# Patient Record
Sex: MALE | Race: WHITE | Employment: UNEMPLOYED | URBAN - METROPOLITAN AREA
[De-identification: names, ages, dates, MRNs, and addresses within clinical notes are randomized per-mention and may not be internally consistent; named-entity substitution may affect disease eponyms.]

---

## 2022-01-01 ENCOUNTER — APPOINTMENT (OUTPATIENT)
Dept: ULTRASOUND IMAGING | Age: 57
DRG: 823 | End: 2022-01-01
Attending: STUDENT IN AN ORGANIZED HEALTH CARE EDUCATION/TRAINING PROGRAM
Payer: MEDICARE

## 2022-01-01 ENCOUNTER — APPOINTMENT (OUTPATIENT)
Dept: GENERAL RADIOLOGY | Age: 57
DRG: 823 | End: 2022-01-01
Attending: STUDENT IN AN ORGANIZED HEALTH CARE EDUCATION/TRAINING PROGRAM
Payer: MEDICARE

## 2022-01-01 ENCOUNTER — APPOINTMENT (OUTPATIENT)
Dept: CT IMAGING | Age: 57
DRG: 823 | End: 2022-01-01
Attending: STUDENT IN AN ORGANIZED HEALTH CARE EDUCATION/TRAINING PROGRAM
Payer: MEDICARE

## 2022-01-01 ENCOUNTER — APPOINTMENT (OUTPATIENT)
Dept: VASCULAR LAB | Age: 57
DRG: 823 | End: 2022-01-01
Attending: STUDENT IN AN ORGANIZED HEALTH CARE EDUCATION/TRAINING PROGRAM
Payer: MEDICARE

## 2022-01-01 ENCOUNTER — HOSPITAL ENCOUNTER (INPATIENT)
Age: 57
LOS: 2 days | DRG: 823 | End: 2022-01-06
Attending: STUDENT IN AN ORGANIZED HEALTH CARE EDUCATION/TRAINING PROGRAM | Admitting: STUDENT IN AN ORGANIZED HEALTH CARE EDUCATION/TRAINING PROGRAM
Payer: MEDICARE

## 2022-01-01 VITALS
TEMPERATURE: 99 F | WEIGHT: 203.04 LBS | HEIGHT: 71 IN | BODY MASS INDEX: 28.43 KG/M2 | DIASTOLIC BLOOD PRESSURE: 28 MMHG | OXYGEN SATURATION: 61 % | HEART RATE: 77 BPM | SYSTOLIC BLOOD PRESSURE: 43 MMHG

## 2022-01-01 DIAGNOSIS — D61.818 PANCYTOPENIA (HCC): Primary | ICD-10-CM

## 2022-01-01 LAB
ABO/RH: NORMAL
ALBUMIN SERPL-MCNC: 2.2 G/DL (ref 3.1–4.9)
ALBUMIN SERPL-MCNC: 2.6 G/DL (ref 3.4–5)
ALBUMIN SERPL-MCNC: 2.6 G/DL (ref 3.4–5)
ALBUMIN SERPL-MCNC: 2.7 G/DL (ref 3.4–5)
ALBUMIN SERPL-MCNC: 2.9 G/DL (ref 3.4–5)
ALBUMIN SERPL-MCNC: 2.9 G/DL (ref 3.4–5)
ALP BLD-CCNC: 116 U/L (ref 40–129)
ALP BLD-CCNC: 118 U/L (ref 40–129)
ALP BLD-CCNC: 146 U/L (ref 40–129)
ALPHA-1-GLOBULIN: 0.5 G/DL (ref 0.2–0.4)
ALPHA-2-GLOBULIN: 0.9 G/DL (ref 0.4–1.1)
ALT SERPL-CCNC: 18 U/L (ref 10–40)
ALT SERPL-CCNC: 21 U/L (ref 10–40)
ALT SERPL-CCNC: 22 U/L (ref 10–40)
AMMONIA: 109 UMOL/L (ref 16–60)
AMMONIA: 48 UMOL/L (ref 16–60)
AMMONIA: 77 UMOL/L (ref 16–60)
ANION GAP SERPL CALCULATED.3IONS-SCNC: 10 MMOL/L (ref 3–16)
ANION GAP SERPL CALCULATED.3IONS-SCNC: 11 MMOL/L (ref 3–16)
ANION GAP SERPL CALCULATED.3IONS-SCNC: 12 MMOL/L (ref 3–16)
ANION GAP SERPL CALCULATED.3IONS-SCNC: 15 MMOL/L (ref 3–16)
ANION GAP SERPL CALCULATED.3IONS-SCNC: 15 MMOL/L (ref 3–16)
ANISOCYTOSIS: ABNORMAL
ANISOCYTOSIS: ABNORMAL
ANTIBODY SCREEN: NORMAL
APPEARANCE CSF: CLEAR
APTT: 31.3 SEC (ref 26.2–38.6)
APTT: 32.6 SEC (ref 26.2–38.6)
AST SERPL-CCNC: 26 U/L (ref 15–37)
AST SERPL-CCNC: 29 U/L (ref 15–37)
AST SERPL-CCNC: 35 U/L (ref 15–37)
BACTERIA: ABNORMAL /HPF
BANDED NEUTROPHILS RELATIVE PERCENT: 3 % (ref 0–7)
BANDED NEUTROPHILS RELATIVE PERCENT: 9 % (ref 0–7)
BASE EXCESS ARTERIAL: -2 (ref -3–3)
BASOPHILS ABSOLUTE: 0 K/UL (ref 0–0.2)
BASOPHILS ABSOLUTE: 0 K/UL (ref 0–0.2)
BASOPHILS RELATIVE PERCENT: 0 %
BASOPHILS RELATIVE PERCENT: 0 %
BETA GLOBULIN: 0.8 G/DL (ref 0.9–1.6)
BILIRUB SERPL-MCNC: 2.4 MG/DL (ref 0–1)
BILIRUB SERPL-MCNC: 2.7 MG/DL (ref 0–1)
BILIRUB SERPL-MCNC: 3.1 MG/DL (ref 0–1)
BILIRUBIN DIRECT: 1.9 MG/DL (ref 0–0.3)
BILIRUBIN DIRECT: 2.1 MG/DL (ref 0–0.3)
BILIRUBIN DIRECT: 2.5 MG/DL (ref 0–0.3)
BILIRUBIN URINE: ABNORMAL
BILIRUBIN, INDIRECT: 0.5 MG/DL (ref 0–1)
BILIRUBIN, INDIRECT: 0.6 MG/DL (ref 0–1)
BILIRUBIN, INDIRECT: 0.6 MG/DL (ref 0–1)
BLOOD BANK DISPENSE STATUS: NORMAL
BLOOD BANK PRODUCT CODE: NORMAL
BLOOD SMEAR REVIEW: NORMAL
BLOOD, URINE: ABNORMAL
BPU ID: NORMAL
BUN BLDV-MCNC: 65 MG/DL (ref 7–20)
BUN BLDV-MCNC: 70 MG/DL (ref 7–20)
BUN BLDV-MCNC: 71 MG/DL (ref 7–20)
BUN BLDV-MCNC: 72 MG/DL (ref 7–20)
BUN BLDV-MCNC: 72 MG/DL (ref 7–20)
BUN BLDV-MCNC: 74 MG/DL (ref 7–20)
BUN BLDV-MCNC: 78 MG/DL (ref 7–20)
C-REACTIVE PROTEIN: 545.3 MG/L (ref 0–5.1)
CALCIUM IONIZED: 1.32 MMOL/L (ref 1.12–1.32)
CALCIUM IONIZED: 1.39 MMOL/L (ref 1.12–1.32)
CALCIUM SERPL-MCNC: 8.1 MG/DL (ref 8.3–10.6)
CALCIUM SERPL-MCNC: 8.4 MG/DL (ref 8.3–10.6)
CALCIUM SERPL-MCNC: 8.6 MG/DL (ref 8.3–10.6)
CALCIUM SERPL-MCNC: 9.1 MG/DL (ref 8.3–10.6)
CALCIUM SERPL-MCNC: 9.2 MG/DL (ref 8.3–10.6)
CALCIUM SERPL-MCNC: 9.3 MG/DL (ref 8.3–10.6)
CALCIUM SERPL-MCNC: 9.8 MG/DL (ref 8.3–10.6)
CHLORIDE BLD-SCNC: 109 MMOL/L (ref 99–110)
CHLORIDE BLD-SCNC: 112 MMOL/L (ref 99–110)
CHLORIDE BLD-SCNC: 114 MMOL/L (ref 99–110)
CHLORIDE BLD-SCNC: 114 MMOL/L (ref 99–110)
CHLORIDE BLD-SCNC: 116 MMOL/L (ref 99–110)
CHLORIDE BLD-SCNC: 117 MMOL/L (ref 99–110)
CHLORIDE BLD-SCNC: 118 MMOL/L (ref 99–110)
CHLORIDE URINE RANDOM: <20 MMOL/L
CLARITY: CLEAR
CLOT EVALUATION CSF: ABNORMAL
CO2: 22 MMOL/L (ref 21–32)
CO2: 23 MMOL/L (ref 21–32)
CO2: 23 MMOL/L (ref 21–32)
CO2: 24 MMOL/L (ref 21–32)
CO2: 25 MMOL/L (ref 21–32)
COLOR CSF: ABNORMAL
COLOR: YELLOW
CORTISOL TOTAL: 34.8 UG/DL
CREAT SERPL-MCNC: 2.4 MG/DL (ref 0.9–1.3)
CREAT SERPL-MCNC: 2.6 MG/DL (ref 0.9–1.3)
CREAT SERPL-MCNC: 2.6 MG/DL (ref 0.9–1.3)
CREAT SERPL-MCNC: 2.8 MG/DL (ref 0.9–1.3)
CREAT SERPL-MCNC: 2.8 MG/DL (ref 0.9–1.3)
CREAT SERPL-MCNC: 2.9 MG/DL (ref 0.9–1.3)
CREAT SERPL-MCNC: 3.3 MG/DL (ref 0.9–1.3)
CREATININE URINE: 97.9 MG/DL (ref 39–259)
D DIMER: 485 NG/ML DDU (ref 0–229)
DESCRIPTION BLOOD BANK: NORMAL
DOHLE BODIES: PRESENT
EKG ATRIAL RATE: 107 BPM
EKG DIAGNOSIS: NORMAL
EKG P AXIS: 36 DEGREES
EKG P-R INTERVAL: 136 MS
EKG Q-T INTERVAL: 316 MS
EKG QRS DURATION: 102 MS
EKG QTC CALCULATION (BAZETT): 421 MS
EKG R AXIS: 18 DEGREES
EKG T AXIS: 82 DEGREES
EKG VENTRICULAR RATE: 107 BPM
EOSINOPHILS ABSOLUTE: 0 K/UL (ref 0–0.6)
EOSINOPHILS ABSOLUTE: 0 K/UL (ref 0–0.6)
EOSINOPHILS RELATIVE PERCENT: 0 %
EOSINOPHILS RELATIVE PERCENT: 3 %
FERRITIN: ABNORMAL NG/ML (ref 30–400)
FIBRINOGEN: 766 MG/DL (ref 200–397)
GAMMA GLOBULIN: 0.4 G/DL (ref 0.6–1.8)
GFR AFRICAN AMERICAN: 24
GFR AFRICAN AMERICAN: 27
GFR AFRICAN AMERICAN: 28
GFR AFRICAN AMERICAN: 28
GFR AFRICAN AMERICAN: 31
GFR AFRICAN AMERICAN: 31
GFR AFRICAN AMERICAN: 34
GFR NON-AFRICAN AMERICAN: 19
GFR NON-AFRICAN AMERICAN: 23
GFR NON-AFRICAN AMERICAN: 26
GFR NON-AFRICAN AMERICAN: 26
GFR NON-AFRICAN AMERICAN: 28
GLUCOSE BLD-MCNC: 117 MG/DL (ref 70–99)
GLUCOSE BLD-MCNC: 121 MG/DL (ref 70–99)
GLUCOSE BLD-MCNC: 131 MG/DL (ref 70–99)
GLUCOSE BLD-MCNC: 134 MG/DL (ref 70–99)
GLUCOSE BLD-MCNC: 135 MG/DL (ref 70–99)
GLUCOSE BLD-MCNC: 154 MG/DL (ref 70–99)
GLUCOSE BLD-MCNC: 157 MG/DL (ref 70–99)
GLUCOSE BLD-MCNC: 220 MG/DL (ref 70–99)
GLUCOSE BLD-MCNC: 379 MG/DL (ref 70–99)
GLUCOSE URINE: NEGATIVE MG/DL
GLUCOSE, CSF: 82 MG/DL (ref 40–80)
HAPTOGLOBIN: 264 MG/DL (ref 30–200)
HCO3 ARTERIAL: 23.3 MMOL/L (ref 21–29)
HCT VFR BLD CALC: 17.5 % (ref 40.5–52.5)
HCT VFR BLD CALC: 19.4 % (ref 40.5–52.5)
HCT VFR BLD CALC: 21.6 % (ref 40.5–52.5)
HCT VFR BLD CALC: 25.3 % (ref 40.5–52.5)
HEMOGLOBIN: 5.9 G/DL (ref 13.5–17.5)
HEMOGLOBIN: 6.5 G/DL (ref 13.5–17.5)
HEMOGLOBIN: 7.2 G/DL (ref 13.5–17.5)
HEMOGLOBIN: 8.4 G/DL (ref 13.5–17.5)
HYPOCHROMIA: ABNORMAL
IMMATURE RETIC FRACT: 0.32 (ref 0.21–0.37)
INR BLD: 1.66 (ref 0.88–1.12)
INR BLD: 1.8 (ref 0.88–1.12)
INR BLD: 1.95 (ref 0.88–1.12)
KAPPA, FREE LIGHT CHAINS, SERUM: 27.95 MG/L (ref 3.3–19.4)
KAPPA/LAMBDA RATIO: 1.37 (ref 0.26–1.65)
KAPPA/LAMBDA TEST COMMENT: ABNORMAL
KETONES, URINE: NEGATIVE MG/DL
L. PNEUMOPHILA SEROGP 1 UR AG: NORMAL
LACTATE DEHYDROGENASE: 1314 U/L (ref 100–190)
LACTATE DEHYDROGENASE: 935 U/L (ref 100–190)
LACTATE: 1.07 MMOL/L (ref 0.4–2)
LACTATE: 1.98 MMOL/L (ref 0.4–2)
LACTATE: 2.19 MMOL/L (ref 0.4–2)
LACTATE: 2.21 MMOL/L (ref 0.4–2)
LACTATE: 2.79 MMOL/L (ref 0.4–2)
LACTATE: 3.12 MMOL/L (ref 0.4–2)
LACTIC ACID: 2.2 MMOL/L (ref 0.4–2)
LACTIC ACID: 2.4 MMOL/L (ref 0.4–2)
LACTIC ACID: 2.5 MMOL/L (ref 0.4–2)
LACTIC ACID: 3.1 MMOL/L (ref 0.4–2)
LAMBDA, FREE LIGHT CHAINS, SERUM: 20.35 MG/L (ref 5.71–26.3)
LEUKOCYTE ESTERASE, URINE: ABNORMAL
LV EF: 58 %
LVEF MODALITY: NORMAL
LYMPHOCYTES ABSOLUTE: 0.1 K/UL (ref 1–5.1)
LYMPHOCYTES ABSOLUTE: 0.1 K/UL (ref 1–5.1)
LYMPHOCYTES RELATIVE PERCENT: 14 %
LYMPHOCYTES RELATIVE PERCENT: 23 %
MAGNESIUM: 2.8 MG/DL (ref 1.8–2.4)
MCH RBC QN AUTO: 26.2 PG (ref 26–34)
MCH RBC QN AUTO: 26.3 PG (ref 26–34)
MCH RBC QN AUTO: 26.8 PG (ref 26–34)
MCH RBC QN AUTO: 28.3 PG (ref 26–34)
MCHC RBC AUTO-ENTMCNC: 33.3 G/DL (ref 31–36)
MCHC RBC AUTO-ENTMCNC: 33.3 G/DL (ref 31–36)
MCHC RBC AUTO-ENTMCNC: 33.5 G/DL (ref 31–36)
MCHC RBC AUTO-ENTMCNC: 33.5 G/DL (ref 31–36)
MCV RBC AUTO: 78.3 FL (ref 80–100)
MCV RBC AUTO: 78.5 FL (ref 80–100)
MCV RBC AUTO: 80.6 FL (ref 80–100)
MCV RBC AUTO: 85 FL (ref 80–100)
MENINGITIS ENCEPHALITIS PANEL: NORMAL
METAMYELOCYTES RELATIVE PERCENT: 1 %
MICROCYTES: ABNORMAL
MICROSCOPIC EXAMINATION: YES
MONOCYTES ABSOLUTE: 0 K/UL (ref 0–1.3)
MONOCYTES ABSOLUTE: 0 K/UL (ref 0–1.3)
MONOCYTES RELATIVE PERCENT: 4 %
MONOCYTES RELATIVE PERCENT: 7 %
MRSA SCREEN RT-PCR: ABNORMAL
NEUTROPHILS ABSOLUTE: 0.4 K/UL (ref 1.7–7.7)
NEUTROPHILS ABSOLUTE: 0.5 K/UL (ref 1.7–7.7)
NEUTROPHILS RELATIVE PERCENT: 67 %
NEUTROPHILS RELATIVE PERCENT: 69 %
NITRITE, URINE: NEGATIVE
NO DIFFERENTIAL CSF: ABNORMAL
NUCLEATED RED BLOOD CELLS: 1 /100 WBC
O2 SAT, ARTERIAL: 87 % (ref 93–100)
ORGANISM: ABNORMAL
PARATHYROID HORMONE INTACT: 9.4 PG/ML (ref 14–72)
PCO2 ARTERIAL: 39.3 MM HG (ref 35–45)
PCO2, VEN: >250 MM HG (ref 40–50)
PDW BLD-RTO: 18.1 % (ref 12.4–15.4)
PDW BLD-RTO: 18.1 % (ref 12.4–15.4)
PDW BLD-RTO: 18.3 % (ref 12.4–15.4)
PDW BLD-RTO: 18.4 % (ref 12.4–15.4)
PERFORMED ON: ABNORMAL
PERFORMED ON: NORMAL
PH ARTERIAL: 7.38 (ref 7.35–7.45)
PH UA: 6 (ref 5–8)
PH VENOUS: 6.61 (ref 7.35–7.45)
PHOSPHORUS: 10.8 MG/DL (ref 2.5–4.9)
PHOSPHORUS: 5.1 MG/DL (ref 2.5–4.9)
PHOSPHORUS: 5.2 MG/DL (ref 2.5–4.9)
PHOSPHORUS: 5.4 MG/DL (ref 2.5–4.9)
PHOSPHORUS: 5.7 MG/DL (ref 2.5–4.9)
PHOSPHORUS: 5.8 MG/DL (ref 2.5–4.9)
PHOSPHORUS: 5.8 MG/DL (ref 2.5–4.9)
PHOSPHORUS: 7.7 MG/DL (ref 2.5–4.9)
PLATELET # BLD: 22 K/UL (ref 135–450)
PLATELET # BLD: 48 K/UL (ref 135–450)
PLATELET # BLD: 48 K/UL (ref 135–450)
PLATELET # BLD: 65 K/UL (ref 135–450)
PLATELET SLIDE REVIEW: ABNORMAL
PMV BLD AUTO: 7.1 FL (ref 5–10.5)
PMV BLD AUTO: 7.4 FL (ref 5–10.5)
PMV BLD AUTO: 7.7 FL (ref 5–10.5)
PMV BLD AUTO: 8.2 FL (ref 5–10.5)
PO2 ARTERIAL: 53.4 MM HG (ref 75–108)
PO2, VEN: 26 MM HG
POC POTASSIUM: 3.4 MMOL/L (ref 3.5–5.1)
POC POTASSIUM: 6.9 MMOL/L (ref 3.5–5.1)
POC SAMPLE TYPE: ABNORMAL
POC SAMPLE TYPE: NORMAL
POC SODIUM: 140 MMOL/L (ref 136–145)
POC SODIUM: 153 MMOL/L (ref 136–145)
POTASSIUM SERPL-SCNC: 3.5 MMOL/L (ref 3.5–5.1)
POTASSIUM SERPL-SCNC: 3.6 MMOL/L (ref 3.5–5.1)
POTASSIUM SERPL-SCNC: 3.7 MMOL/L (ref 3.5–5.1)
POTASSIUM SERPL-SCNC: 3.7 MMOL/L (ref 3.5–5.1)
POTASSIUM SERPL-SCNC: 3.8 MMOL/L (ref 3.5–5.1)
POTASSIUM SERPL-SCNC: 4.3 MMOL/L (ref 3.5–5.1)
POTASSIUM SERPL-SCNC: 5.5 MMOL/L (ref 3.5–5.1)
PRO-BNP: 3543 PG/ML (ref 0–124)
PROCALCITONIN: 5.25 NG/ML (ref 0–0.15)
PROTEIN CSF: 46 MG/DL (ref 15–45)
PROTEIN PROTEIN: 0.04 G/DL
PROTEIN PROTEIN: 33 MG/DL
PROTEIN PROTEIN: 43 MG/DL
PROTEIN UA: ABNORMAL MG/DL
PROTEIN/CREAT RATIO: 0.3 MG/DL
PROTHROMBIN TIME: 19.2 SEC (ref 9.9–12.7)
PROTHROMBIN TIME: 20.9 SEC (ref 9.9–12.7)
PROTHROMBIN TIME: 22.6 SEC (ref 9.9–12.7)
RBC # BLD: 2.23 M/UL (ref 4.2–5.9)
RBC # BLD: 2.41 M/UL (ref 4.2–5.9)
RBC # BLD: 2.75 M/UL (ref 4.2–5.9)
RBC # BLD: 2.98 M/UL (ref 4.2–5.9)
RBC CSF: 1353 /CUMM
RBC UA: ABNORMAL /HPF (ref 0–4)
REPORT: NORMAL
REPORT: NORMAL
RESPIRATORY PANEL PCR: NORMAL
RETICULOCYTE ABSOLUTE COUNT: 0 M/UL
RETICULOCYTE COUNT PCT: 0.14 % (ref 0.5–2.18)
SARS-COV-2, NAAT: NOT DETECTED
SARS-COV-2, PCR: NOT DETECTED
SEDIMENTATION RATE, ERYTHROCYTE: >120 MM/HR (ref 0–20)
SLIDE REVIEW: ABNORMAL
SODIUM BLD-SCNC: 144 MMOL/L (ref 136–145)
SODIUM BLD-SCNC: 149 MMOL/L (ref 136–145)
SODIUM BLD-SCNC: 150 MMOL/L (ref 136–145)
SODIUM BLD-SCNC: 151 MMOL/L (ref 136–145)
SODIUM BLD-SCNC: 153 MMOL/L (ref 136–145)
SODIUM BLD-SCNC: 153 MMOL/L (ref 136–145)
SODIUM BLD-SCNC: 154 MMOL/L (ref 136–145)
SODIUM URINE: <20 MMOL/L
SPECIFIC GRAVITY UA: 1.01 (ref 1–1.03)
SPHEROCYTES: ABNORMAL
STREP PNEUMONIAE ANTIGEN, URINE: NORMAL
T4 FREE: 0.4 NG/DL (ref 0.9–1.8)
TCO2 ARTERIAL: 25 MMOL/L
THROAT CULTURE: ABNORMAL
THROAT CULTURE: ABNORMAL
TOTAL PROTEIN: 4.8 G/DL (ref 6.4–8.2)
TOTAL PROTEIN: 5.4 G/DL (ref 6.4–8.2)
TOTAL PROTEIN: 5.4 G/DL (ref 6.4–8.2)
TOTAL PROTEIN: 5.8 G/DL (ref 6.4–8.2)
TOXIC GRANULATION: PRESENT
TRIGL SERPL-MCNC: 418 MG/DL (ref 0–150)
TSH REFLEX: 15.7 UIU/ML (ref 0.27–4.2)
TUBE NUMBER CSF: ABNORMAL
URIC ACID, SERUM: 5 MG/DL (ref 3.5–7.2)
URIC ACID, SERUM: 5.2 MG/DL (ref 3.5–7.2)
URINE CULTURE, ROUTINE: ABNORMAL
URINE TYPE: ABNORMAL
UROBILINOGEN, URINE: 2 E.U./DL
VANCOMYCIN RANDOM: 11.5 UG/ML
VITAMIN D 25-HYDROXY: <5 NG/ML
WBC # BLD: 0.4 K/UL (ref 4–11)
WBC # BLD: 0.5 K/UL (ref 4–11)
WBC # BLD: 0.6 K/UL (ref 4–11)
WBC # BLD: 0.6 K/UL (ref 4–11)
WBC CSF: 1 /CUMM (ref 0–5)
WBC UA: ABNORMAL /HPF (ref 0–5)

## 2022-01-01 PROCEDURE — 6360000002 HC RX W HCPCS: Performed by: NURSE PRACTITIONER

## 2022-01-01 PROCEDURE — 87641 MR-STAPH DNA AMP PROBE: CPT

## 2022-01-01 PROCEDURE — 88342 IMHCHEM/IMCYTCHM 1ST ANTB: CPT

## 2022-01-01 PROCEDURE — 83735 ASSAY OF MAGNESIUM: CPT

## 2022-01-01 PROCEDURE — 36415 COLL VENOUS BLD VENIPUNCTURE: CPT

## 2022-01-01 PROCEDURE — 84155 ASSAY OF PROTEIN SERUM: CPT

## 2022-01-01 PROCEDURE — 85027 COMPLETE CBC AUTOMATED: CPT

## 2022-01-01 PROCEDURE — 87186 SC STD MICRODIL/AGAR DIL: CPT

## 2022-01-01 PROCEDURE — 86900 BLOOD TYPING SEROLOGIC ABO: CPT

## 2022-01-01 PROCEDURE — 89050 BODY FLUID CELL COUNT: CPT

## 2022-01-01 PROCEDURE — 85025 COMPLETE CBC W/AUTO DIFF WBC: CPT

## 2022-01-01 PROCEDURE — 85730 THROMBOPLASTIN TIME PARTIAL: CPT

## 2022-01-01 PROCEDURE — 87077 CULTURE AEROBIC IDENTIFY: CPT

## 2022-01-01 PROCEDURE — 84132 ASSAY OF SERUM POTASSIUM: CPT

## 2022-01-01 PROCEDURE — 85652 RBC SED RATE AUTOMATED: CPT

## 2022-01-01 PROCEDURE — 82436 ASSAY OF URINE CHLORIDE: CPT

## 2022-01-01 PROCEDURE — 02HV33Z INSERTION OF INFUSION DEVICE INTO SUPERIOR VENA CAVA, PERCUTANEOUS APPROACH: ICD-10-PCS

## 2022-01-01 PROCEDURE — 99221 1ST HOSP IP/OBS SF/LOW 40: CPT | Performed by: SURGERY

## 2022-01-01 PROCEDURE — 84100 ASSAY OF PHOSPHORUS: CPT

## 2022-01-01 PROCEDURE — 88112 CYTOPATH CELL ENHANCE TECH: CPT

## 2022-01-01 PROCEDURE — C9113 INJ PANTOPRAZOLE SODIUM, VIA: HCPCS | Performed by: NURSE PRACTITIONER

## 2022-01-01 PROCEDURE — 2500000003 HC RX 250 WO HCPCS: Performed by: INTERNAL MEDICINE

## 2022-01-01 PROCEDURE — 80069 RENAL FUNCTION PANEL: CPT

## 2022-01-01 PROCEDURE — 99221 1ST HOSP IP/OBS SF/LOW 40: CPT | Performed by: NURSE PRACTITIONER

## 2022-01-01 PROCEDURE — 2580000003 HC RX 258: Performed by: INTERNAL MEDICINE

## 2022-01-01 PROCEDURE — 85379 FIBRIN DEGRADATION QUANT: CPT

## 2022-01-01 PROCEDURE — P9036 PLATELET PHERESIS IRRADIATED: HCPCS

## 2022-01-01 PROCEDURE — 84165 PROTEIN E-PHORESIS SERUM: CPT

## 2022-01-01 PROCEDURE — 81001 URINALYSIS AUTO W/SCOPE: CPT

## 2022-01-01 PROCEDURE — 85610 PROTHROMBIN TIME: CPT

## 2022-01-01 PROCEDURE — 82947 ASSAY GLUCOSE BLOOD QUANT: CPT

## 2022-01-01 PROCEDURE — 93306 TTE W/DOPPLER COMPLETE: CPT

## 2022-01-01 PROCEDURE — 6360000002 HC RX W HCPCS: Performed by: INTERNAL MEDICINE

## 2022-01-01 PROCEDURE — 88305 TISSUE EXAM BY PATHOLOGIST: CPT

## 2022-01-01 PROCEDURE — 2580000003 HC RX 258: Performed by: STUDENT IN AN ORGANIZED HEALTH CARE EDUCATION/TRAINING PROGRAM

## 2022-01-01 PROCEDURE — 84166 PROTEIN E-PHORESIS/URINE/CSF: CPT

## 2022-01-01 PROCEDURE — 36592 COLLECT BLOOD FROM PICC: CPT

## 2022-01-01 PROCEDURE — 84443 ASSAY THYROID STIM HORMONE: CPT

## 2022-01-01 PROCEDURE — 87253 VIRUS INOCULATE TISSUE ADDL: CPT

## 2022-01-01 PROCEDURE — 82330 ASSAY OF CALCIUM: CPT

## 2022-01-01 PROCEDURE — 2500000003 HC RX 250 WO HCPCS: Performed by: STUDENT IN AN ORGANIZED HEALTH CARE EDUCATION/TRAINING PROGRAM

## 2022-01-01 PROCEDURE — 84157 ASSAY OF PROTEIN OTHER: CPT

## 2022-01-01 PROCEDURE — 83010 ASSAY OF HAPTOGLOBIN QUANT: CPT

## 2022-01-01 PROCEDURE — 2580000003 HC RX 258: Performed by: NURSE PRACTITIONER

## 2022-01-01 PROCEDURE — 87205 SMEAR GRAM STAIN: CPT

## 2022-01-01 PROCEDURE — 83615 LACTATE (LD) (LDH) ENZYME: CPT

## 2022-01-01 PROCEDURE — P9040 RBC LEUKOREDUCED IRRADIATED: HCPCS

## 2022-01-01 PROCEDURE — 87040 BLOOD CULTURE FOR BACTERIA: CPT

## 2022-01-01 PROCEDURE — 6370000000 HC RX 637 (ALT 250 FOR IP): Performed by: NURSE PRACTITIONER

## 2022-01-01 PROCEDURE — 99232 SBSQ HOSP IP/OBS MODERATE 35: CPT

## 2022-01-01 PROCEDURE — 84300 ASSAY OF URINE SODIUM: CPT

## 2022-01-01 PROCEDURE — 36430 TRANSFUSION BLD/BLD COMPNT: CPT

## 2022-01-01 PROCEDURE — 62328 DX LMBR SPI PNXR W/FLUOR/CT: CPT

## 2022-01-01 PROCEDURE — 83605 ASSAY OF LACTIC ACID: CPT

## 2022-01-01 PROCEDURE — 80076 HEPATIC FUNCTION PANEL: CPT

## 2022-01-01 PROCEDURE — 86923 COMPATIBILITY TEST ELECTRIC: CPT

## 2022-01-01 PROCEDURE — 84550 ASSAY OF BLOOD/URIC ACID: CPT

## 2022-01-01 PROCEDURE — 88311 DECALCIFY TISSUE: CPT

## 2022-01-01 PROCEDURE — 88341 IMHCHEM/IMCYTCHM EA ADD ANTB: CPT

## 2022-01-01 PROCEDURE — U0005 INFEC AGEN DETEC AMPLI PROBE: HCPCS

## 2022-01-01 PROCEDURE — 84478 ASSAY OF TRIGLYCERIDES: CPT

## 2022-01-01 PROCEDURE — 83880 ASSAY OF NATRIURETIC PEPTIDE: CPT

## 2022-01-01 PROCEDURE — 86850 RBC ANTIBODY SCREEN: CPT

## 2022-01-01 PROCEDURE — 93970 EXTREMITY STUDY: CPT

## 2022-01-01 PROCEDURE — 87103 BLOOD FUNGUS CULTURE: CPT

## 2022-01-01 PROCEDURE — 87449 NOS EACH ORGANISM AG IA: CPT

## 2022-01-01 PROCEDURE — 82140 ASSAY OF AMMONIA: CPT

## 2022-01-01 PROCEDURE — 2060000000 HC ICU INTERMEDIATE R&B

## 2022-01-01 PROCEDURE — 87086 URINE CULTURE/COLONY COUNT: CPT

## 2022-01-01 PROCEDURE — 87635 SARS-COV-2 COVID-19 AMP PRB: CPT

## 2022-01-01 PROCEDURE — 82728 ASSAY OF FERRITIN: CPT

## 2022-01-01 PROCEDURE — 84145 PROCALCITONIN (PCT): CPT

## 2022-01-01 PROCEDURE — 07BJ3ZX EXCISION OF LEFT INGUINAL LYMPHATIC, PERCUTANEOUS APPROACH, DIAGNOSTIC: ICD-10-PCS | Performed by: RADIOLOGY

## 2022-01-01 PROCEDURE — 74176 CT ABD & PELVIS W/O CONTRAST: CPT

## 2022-01-01 PROCEDURE — 74018 RADEX ABDOMEN 1 VIEW: CPT

## 2022-01-01 PROCEDURE — 83970 ASSAY OF PARATHORMONE: CPT

## 2022-01-01 PROCEDURE — 94761 N-INVAS EAR/PLS OXIMETRY MLT: CPT

## 2022-01-01 PROCEDURE — 84295 ASSAY OF SERUM SODIUM: CPT

## 2022-01-01 PROCEDURE — 6360000002 HC RX W HCPCS: Performed by: RADIOLOGY

## 2022-01-01 PROCEDURE — 87252 VIRUS INOCULATION TISSUE: CPT

## 2022-01-01 PROCEDURE — 82945 GLUCOSE OTHER FLUID: CPT

## 2022-01-01 PROCEDURE — 86901 BLOOD TYPING SEROLOGIC RH(D): CPT

## 2022-01-01 PROCEDURE — 87102 FUNGUS ISOLATION CULTURE: CPT

## 2022-01-01 PROCEDURE — 82533 TOTAL CORTISOL: CPT

## 2022-01-01 PROCEDURE — U0003 INFECTIOUS AGENT DETECTION BY NUCLEIC ACID (DNA OR RNA); SEVERE ACUTE RESPIRATORY SYNDROME CORONAVIRUS 2 (SARS-COV-2) (CORONAVIRUS DISEASE [COVID-19]), AMPLIFIED PROBE TECHNIQUE, MAKING USE OF HIGH THROUGHPUT TECHNOLOGIES AS DESCRIBED BY CMS-2020-01-R: HCPCS

## 2022-01-01 PROCEDURE — 84156 ASSAY OF PROTEIN URINE: CPT

## 2022-01-01 PROCEDURE — 80202 ASSAY OF VANCOMYCIN: CPT

## 2022-01-01 PROCEDURE — 82803 BLOOD GASES ANY COMBINATION: CPT

## 2022-01-01 PROCEDURE — 86140 C-REACTIVE PROTEIN: CPT

## 2022-01-01 PROCEDURE — APPNB45 APP NON BILLABLE 31-45 MINUTES

## 2022-01-01 PROCEDURE — 71045 X-RAY EXAM CHEST 1 VIEW: CPT

## 2022-01-01 PROCEDURE — 85045 AUTOMATED RETICULOCYTE COUNT: CPT

## 2022-01-01 PROCEDURE — 07DR3ZX EXTRACTION OF ILIAC BONE MARROW, PERCUTANEOUS APPROACH, DIAGNOSTIC: ICD-10-PCS | Performed by: STUDENT IN AN ORGANIZED HEALTH CARE EDUCATION/TRAINING PROGRAM

## 2022-01-01 PROCEDURE — 93010 ELECTROCARDIOGRAM REPORT: CPT | Performed by: INTERNAL MEDICINE

## 2022-01-01 PROCEDURE — 2000000000 HC ICU R&B

## 2022-01-01 PROCEDURE — 82570 ASSAY OF URINE CREATININE: CPT

## 2022-01-01 PROCEDURE — 88365 INSITU HYBRIDIZATION (FISH): CPT

## 2022-01-01 PROCEDURE — 0202U NFCT DS 22 TRGT SARS-COV-2: CPT

## 2022-01-01 PROCEDURE — 2709999900 US BIOPSY LYMPH NODE

## 2022-01-01 PROCEDURE — P9017 PLASMA 1 DONOR FRZ W/IN 8 HR: HCPCS

## 2022-01-01 PROCEDURE — 009U3ZX DRAINAGE OF SPINAL CANAL, PERCUTANEOUS APPROACH, DIAGNOSTIC: ICD-10-PCS | Performed by: RADIOLOGY

## 2022-01-01 PROCEDURE — 87305 ASPERGILLUS AG IA: CPT

## 2022-01-01 PROCEDURE — 93005 ELECTROCARDIOGRAM TRACING: CPT | Performed by: NURSE PRACTITIONER

## 2022-01-01 PROCEDURE — 84439 ASSAY OF FREE THYROXINE: CPT

## 2022-01-01 PROCEDURE — 88360 TUMOR IMMUNOHISTOCHEM/MANUAL: CPT

## 2022-01-01 PROCEDURE — 87070 CULTURE OTHR SPECIMN AEROBIC: CPT

## 2022-01-01 PROCEDURE — 71046 X-RAY EXAM CHEST 2 VIEWS: CPT

## 2022-01-01 PROCEDURE — 82542 COL CHROMOTOGRAPHY QUAL/QUAN: CPT

## 2022-01-01 PROCEDURE — 70450 CT HEAD/BRAIN W/O DYE: CPT

## 2022-01-01 PROCEDURE — 99223 1ST HOSP IP/OBS HIGH 75: CPT | Performed by: INTERNAL MEDICINE

## 2022-01-01 PROCEDURE — 6360000002 HC RX W HCPCS

## 2022-01-01 PROCEDURE — 6360000002 HC RX W HCPCS: Performed by: STUDENT IN AN ORGANIZED HEALTH CARE EDUCATION/TRAINING PROGRAM

## 2022-01-01 PROCEDURE — 88313 SPECIAL STAINS GROUP 2: CPT

## 2022-01-01 PROCEDURE — 82306 VITAMIN D 25 HYDROXY: CPT

## 2022-01-01 PROCEDURE — 87483 CNS DNA AMP PROBE TYPE 12-25: CPT

## 2022-01-01 PROCEDURE — 85384 FIBRINOGEN ACTIVITY: CPT

## 2022-01-01 PROCEDURE — 82800 BLOOD PH: CPT

## 2022-01-01 RX ORDER — SODIUM CHLORIDE 9 MG/ML
INJECTION, SOLUTION INTRAVENOUS PRN
Status: DISCONTINUED | OUTPATIENT
Start: 2022-01-01 | End: 2022-01-01 | Stop reason: HOSPADM

## 2022-01-01 RX ORDER — LORAZEPAM 2 MG/ML
1 INJECTION INTRAMUSCULAR ONCE
Status: COMPLETED | OUTPATIENT
Start: 2022-01-01 | End: 2022-01-01

## 2022-01-01 RX ORDER — SODIUM CHLORIDE 0.9 % (FLUSH) 0.9 %
5-40 SYRINGE (ML) INJECTION EVERY 12 HOURS SCHEDULED
Status: DISCONTINUED | OUTPATIENT
Start: 2022-01-01 | End: 2022-01-01 | Stop reason: HOSPADM

## 2022-01-01 RX ORDER — ALLOPURINOL 300 MG/1
300 TABLET ORAL DAILY
Status: DISCONTINUED | OUTPATIENT
Start: 2022-01-01 | End: 2022-01-01 | Stop reason: HOSPADM

## 2022-01-01 RX ORDER — FLUCONAZOLE 100 MG/1
100 TABLET ORAL DAILY
Status: DISCONTINUED | OUTPATIENT
Start: 2022-01-01 | End: 2022-01-01

## 2022-01-01 RX ORDER — LIDOCAINE HYDROCHLORIDE 10 MG/ML
5 INJECTION, SOLUTION INFILTRATION; PERINEURAL ONCE
Status: COMPLETED | OUTPATIENT
Start: 2022-01-01 | End: 2022-01-01

## 2022-01-01 RX ORDER — POLYETHYLENE GLYCOL 3350 17 G/17G
17 POWDER, FOR SOLUTION ORAL DAILY PRN
Status: DISCONTINUED | OUTPATIENT
Start: 2022-01-01 | End: 2022-01-01 | Stop reason: HOSPADM

## 2022-01-01 RX ORDER — DEXTROSE MONOHYDRATE 50 MG/ML
100 INJECTION, SOLUTION INTRAVENOUS PRN
Status: DISCONTINUED | OUTPATIENT
Start: 2022-01-01 | End: 2022-01-01 | Stop reason: HOSPADM

## 2022-01-01 RX ORDER — LEVOTHYROXINE SODIUM 112 UG/1
112 TABLET ORAL DAILY
Status: DISCONTINUED | OUTPATIENT
Start: 2022-01-01 | End: 2022-01-01

## 2022-01-01 RX ORDER — FLUCONAZOLE 200 MG/1
200 TABLET ORAL DAILY
Status: DISCONTINUED | OUTPATIENT
Start: 2022-01-01 | End: 2022-01-01

## 2022-01-01 RX ORDER — ONDANSETRON 2 MG/ML
4 INJECTION INTRAMUSCULAR; INTRAVENOUS EVERY 6 HOURS PRN
Status: DISCONTINUED | OUTPATIENT
Start: 2022-01-01 | End: 2022-01-01 | Stop reason: HOSPADM

## 2022-01-01 RX ORDER — VALACYCLOVIR HYDROCHLORIDE 500 MG/1
500 TABLET, FILM COATED ORAL 2 TIMES DAILY
Status: DISCONTINUED | OUTPATIENT
Start: 2022-01-01 | End: 2022-01-01

## 2022-01-01 RX ORDER — SERTRALINE HYDROCHLORIDE 100 MG/1
100 TABLET, FILM COATED ORAL DAILY
Status: DISCONTINUED | OUTPATIENT
Start: 2022-01-01 | End: 2022-01-01 | Stop reason: HOSPADM

## 2022-01-01 RX ORDER — LACTULOSE 10 G/15ML
20 SOLUTION ORAL 3 TIMES DAILY
Status: DISCONTINUED | OUTPATIENT
Start: 2022-01-01 | End: 2022-01-01 | Stop reason: HOSPADM

## 2022-01-01 RX ORDER — SODIUM CHLORIDE 9 MG/ML
INJECTION, SOLUTION INTRAVENOUS PRN
Status: DISCONTINUED | OUTPATIENT
Start: 2022-01-01 | End: 2022-01-01 | Stop reason: SDUPTHER

## 2022-01-01 RX ORDER — DEXTROSE MONOHYDRATE 50 MG/ML
100 INJECTION, SOLUTION INTRAVENOUS PRN
Status: DISCONTINUED | OUTPATIENT
Start: 2022-01-01 | End: 2022-01-01 | Stop reason: SDUPTHER

## 2022-01-01 RX ORDER — SODIUM CHLORIDE 9 MG/ML
25 INJECTION, SOLUTION INTRAVENOUS PRN
Status: DISCONTINUED | OUTPATIENT
Start: 2022-01-01 | End: 2022-01-01 | Stop reason: HOSPADM

## 2022-01-01 RX ORDER — DEXTROSE MONOHYDRATE 25 G/50ML
12.5 INJECTION, SOLUTION INTRAVENOUS PRN
Status: DISCONTINUED | OUTPATIENT
Start: 2022-01-01 | End: 2022-01-01 | Stop reason: HOSPADM

## 2022-01-01 RX ORDER — HALOPERIDOL 5 MG/ML
5 INJECTION INTRAMUSCULAR EVERY 4 HOURS PRN
Status: DISCONTINUED | OUTPATIENT
Start: 2022-01-01 | End: 2022-01-01 | Stop reason: HOSPADM

## 2022-01-01 RX ORDER — PANTOPRAZOLE SODIUM 40 MG/10ML
40 INJECTION, POWDER, LYOPHILIZED, FOR SOLUTION INTRAVENOUS DAILY
Status: DISCONTINUED | OUTPATIENT
Start: 2022-01-01 | End: 2022-01-01 | Stop reason: HOSPADM

## 2022-01-01 RX ORDER — MIDAZOLAM HYDROCHLORIDE 1 MG/ML
1 INJECTION INTRAMUSCULAR; INTRAVENOUS ONCE
Status: COMPLETED | OUTPATIENT
Start: 2022-01-01 | End: 2022-01-01

## 2022-01-01 RX ORDER — VITAMIN B COMPLEX
1000 TABLET ORAL DAILY
Status: DISCONTINUED | OUTPATIENT
Start: 2022-01-01 | End: 2022-01-01 | Stop reason: HOSPADM

## 2022-01-01 RX ORDER — SODIUM CHLORIDE 0.9 % (FLUSH) 0.9 %
5-40 SYRINGE (ML) INJECTION PRN
Status: DISCONTINUED | OUTPATIENT
Start: 2022-01-01 | End: 2022-01-01 | Stop reason: HOSPADM

## 2022-01-01 RX ORDER — HALOPERIDOL 5 MG/ML
5 INJECTION INTRAMUSCULAR EVERY 6 HOURS PRN
Status: DISCONTINUED | OUTPATIENT
Start: 2022-01-01 | End: 2022-01-01

## 2022-01-01 RX ORDER — LEVOTHYROXINE SODIUM 112 UG/1
112 TABLET ORAL DAILY
Status: DISCONTINUED | OUTPATIENT
Start: 2022-01-01 | End: 2022-01-01 | Stop reason: HOSPADM

## 2022-01-01 RX ORDER — NICOTINE POLACRILEX 4 MG
15 LOZENGE BUCCAL PRN
Status: DISCONTINUED | OUTPATIENT
Start: 2022-01-01 | End: 2022-01-01 | Stop reason: SDUPTHER

## 2022-01-01 RX ORDER — DEXTROSE MONOHYDRATE 25 G/50ML
25 INJECTION, SOLUTION INTRAVENOUS ONCE
Status: DISCONTINUED | OUTPATIENT
Start: 2022-01-01 | End: 2022-01-01 | Stop reason: HOSPADM

## 2022-01-01 RX ORDER — ACETAMINOPHEN 325 MG/1
650 TABLET ORAL EVERY 4 HOURS PRN
Status: DISCONTINUED | OUTPATIENT
Start: 2022-01-01 | End: 2022-01-01 | Stop reason: HOSPADM

## 2022-01-01 RX ORDER — INSULIN LISPRO 100 [IU]/ML
0-6 INJECTION, SOLUTION INTRAVENOUS; SUBCUTANEOUS EVERY 6 HOURS
Status: DISCONTINUED | OUTPATIENT
Start: 2022-01-01 | End: 2022-01-01 | Stop reason: HOSPADM

## 2022-01-01 RX ORDER — LORAZEPAM 2 MG/ML
INJECTION INTRAMUSCULAR
Status: COMPLETED
Start: 2022-01-01 | End: 2022-01-01

## 2022-01-01 RX ORDER — LIDOCAINE HYDROCHLORIDE 10 MG/ML
5 INJECTION, SOLUTION EPIDURAL; INFILTRATION; INTRACAUDAL; PERINEURAL ONCE
Status: DISCONTINUED | OUTPATIENT
Start: 2022-01-01 | End: 2022-01-01 | Stop reason: HOSPADM

## 2022-01-01 RX ORDER — MAGNESIUM SULFATE IN WATER 40 MG/ML
4000 INJECTION, SOLUTION INTRAVENOUS PRN
Status: DISCONTINUED | OUTPATIENT
Start: 2022-01-01 | End: 2022-01-01 | Stop reason: HOSPADM

## 2022-01-01 RX ORDER — SODIUM CHLORIDE 9 MG/ML
INJECTION, SOLUTION INTRAVENOUS CONTINUOUS PRN
Status: DISCONTINUED | OUTPATIENT
Start: 2022-01-01 | End: 2022-01-01 | Stop reason: HOSPADM

## 2022-01-01 RX ORDER — DEXTROSE MONOHYDRATE 50 MG/ML
INJECTION, SOLUTION INTRAVENOUS CONTINUOUS
Status: DISCONTINUED | OUTPATIENT
Start: 2022-01-01 | End: 2022-01-01 | Stop reason: HOSPADM

## 2022-01-01 RX ORDER — VALACYCLOVIR HYDROCHLORIDE 500 MG/1
500 TABLET, FILM COATED ORAL DAILY
Status: DISCONTINUED | OUTPATIENT
Start: 2022-01-01 | End: 2022-01-01

## 2022-01-01 RX ORDER — SODIUM CHLORIDE, SODIUM LACTATE, POTASSIUM CHLORIDE, CALCIUM CHLORIDE 600; 310; 30; 20 MG/100ML; MG/100ML; MG/100ML; MG/100ML
INJECTION, SOLUTION INTRAVENOUS ONCE
Status: COMPLETED | OUTPATIENT
Start: 2022-01-01 | End: 2022-01-01

## 2022-01-01 RX ORDER — DEXTROSE MONOHYDRATE 25 G/50ML
12.5 INJECTION, SOLUTION INTRAVENOUS PRN
Status: DISCONTINUED | OUTPATIENT
Start: 2022-01-01 | End: 2022-01-01 | Stop reason: SDUPTHER

## 2022-01-01 RX ORDER — DIPHENHYDRAMINE HCL 25 MG
25 TABLET ORAL NIGHTLY PRN
Status: DISCONTINUED | OUTPATIENT
Start: 2022-01-01 | End: 2022-01-01 | Stop reason: HOSPADM

## 2022-01-01 RX ORDER — POTASSIUM CHLORIDE 29.8 MG/ML
20 INJECTION INTRAVENOUS PRN
Status: DISCONTINUED | OUTPATIENT
Start: 2022-01-01 | End: 2022-01-01 | Stop reason: HOSPADM

## 2022-01-01 RX ORDER — FLUCONAZOLE, SODIUM CHLORIDE 2 MG/ML
100 INJECTION INTRAVENOUS EVERY 24 HOURS
Status: DISCONTINUED | OUTPATIENT
Start: 2022-01-01 | End: 2022-01-01 | Stop reason: HOSPADM

## 2022-01-01 RX ORDER — QUETIAPINE FUMARATE 25 MG/1
25 TABLET, FILM COATED ORAL 2 TIMES DAILY
Status: DISCONTINUED | OUTPATIENT
Start: 2022-01-01 | End: 2022-01-01 | Stop reason: HOSPADM

## 2022-01-01 RX ORDER — LEVOTHYROXINE SODIUM ANHYDROUS 100 UG/5ML
90 INJECTION, POWDER, LYOPHILIZED, FOR SOLUTION INTRAVENOUS DAILY
Status: DISCONTINUED | OUTPATIENT
Start: 2022-01-01 | End: 2022-01-01

## 2022-01-01 RX ORDER — SODIUM CHLORIDE 9 MG/ML
5 INJECTION INTRAVENOUS DAILY
Status: DISCONTINUED | OUTPATIENT
Start: 2022-01-01 | End: 2022-01-01

## 2022-01-01 RX ORDER — NICOTINE POLACRILEX 4 MG
15 LOZENGE BUCCAL PRN
Status: DISCONTINUED | OUTPATIENT
Start: 2022-01-01 | End: 2022-01-01 | Stop reason: HOSPADM

## 2022-01-01 RX ADMIN — Medication 10 ML: at 20:53

## 2022-01-01 RX ADMIN — ACYCLOVIR SODIUM 375 MG: 50 INJECTION, SOLUTION INTRAVENOUS at 23:15

## 2022-01-01 RX ADMIN — MEROPENEM 2000 MG: 1 INJECTION, POWDER, FOR SOLUTION INTRAVENOUS at 16:03

## 2022-01-01 RX ADMIN — VANCOMYCIN HYDROCHLORIDE 1500 MG: 10 INJECTION, POWDER, LYOPHILIZED, FOR SOLUTION INTRAVENOUS at 05:33

## 2022-01-01 RX ADMIN — SERTRALINE 100 MG: 100 TABLET, FILM COATED ORAL at 15:49

## 2022-01-01 RX ADMIN — MIDAZOLAM HYDROCHLORIDE 1 MG: 2 INJECTION, SOLUTION INTRAMUSCULAR; INTRAVENOUS at 17:11

## 2022-01-01 RX ADMIN — SODIUM CHLORIDE, POTASSIUM CHLORIDE, SODIUM LACTATE AND CALCIUM CHLORIDE: 600; 310; 30; 20 INJECTION, SOLUTION INTRAVENOUS at 13:22

## 2022-01-01 RX ADMIN — DEXTROSE MONOHYDRATE: 50 INJECTION, SOLUTION INTRAVENOUS at 17:15

## 2022-01-01 RX ADMIN — LORAZEPAM 1 MG: 2 INJECTION INTRAMUSCULAR; INTRAVENOUS at 19:05

## 2022-01-01 RX ADMIN — DEXTROSE MONOHYDRATE: 50 INJECTION, SOLUTION INTRAVENOUS at 01:12

## 2022-01-01 RX ADMIN — SODIUM CHLORIDE, PRESERVATIVE FREE 10 ML: 5 INJECTION INTRAVENOUS at 14:10

## 2022-01-01 RX ADMIN — HALOPERIDOL LACTATE 5 MG: 5 INJECTION, SOLUTION INTRAMUSCULAR at 18:47

## 2022-01-01 RX ADMIN — AZITHROMYCIN MONOHYDRATE 500 MG: 500 INJECTION, POWDER, LYOPHILIZED, FOR SOLUTION INTRAVENOUS at 06:14

## 2022-01-01 RX ADMIN — MEROPENEM 2000 MG: 1 INJECTION, POWDER, FOR SOLUTION INTRAVENOUS at 03:40

## 2022-01-01 RX ADMIN — AZITHROMYCIN MONOHYDRATE 500 MG: 500 INJECTION, POWDER, LYOPHILIZED, FOR SOLUTION INTRAVENOUS at 07:53

## 2022-01-01 RX ADMIN — VANCOMYCIN HYDROCHLORIDE 1500 MG: 10 INJECTION, POWDER, LYOPHILIZED, FOR SOLUTION INTRAVENOUS at 08:36

## 2022-01-01 RX ADMIN — PANTOPRAZOLE SODIUM 40 MG: 40 INJECTION, POWDER, FOR SOLUTION INTRAVENOUS at 17:18

## 2022-01-01 RX ADMIN — DEXMEDETOMIDINE HYDROCHLORIDE 0.5 MCG/KG/HR: 100 INJECTION, SOLUTION INTRAVENOUS at 22:34

## 2022-01-01 RX ADMIN — DEXTROSE MONOHYDRATE: 50 INJECTION, SOLUTION INTRAVENOUS at 13:47

## 2022-01-01 RX ADMIN — FLUCONAZOLE, SODIUM CHLORIDE 100 MG: 2 INJECTION INTRAVENOUS at 09:11

## 2022-01-01 RX ADMIN — MEROPENEM 1000 MG: 1 INJECTION, POWDER, FOR SOLUTION INTRAVENOUS at 17:16

## 2022-01-01 RX ADMIN — QUETIAPINE FUMARATE 25 MG: 25 TABLET ORAL at 21:27

## 2022-01-01 RX ADMIN — ALLOPURINOL 300 MG: 300 TABLET ORAL at 15:49

## 2022-01-01 RX ADMIN — Medication 1000 UNITS: at 15:49

## 2022-01-01 RX ADMIN — ACYCLOVIR SODIUM 375 MG: 50 INJECTION, SOLUTION INTRAVENOUS at 07:55

## 2022-01-01 RX ADMIN — PANTOPRAZOLE SODIUM 40 MG: 40 INJECTION, POWDER, FOR SOLUTION INTRAVENOUS at 14:09

## 2022-01-01 RX ADMIN — THIAMINE HYDROCHLORIDE 100 MG: 100 INJECTION, SOLUTION INTRAMUSCULAR; INTRAVENOUS at 17:40

## 2022-01-01 RX ADMIN — LIDOCAINE HYDROCHLORIDE 5 ML: 10 INJECTION, SOLUTION INFILTRATION; PERINEURAL at 16:54

## 2022-01-01 RX ADMIN — DEXTROSE MONOHYDRATE: 50 INJECTION, SOLUTION INTRAVENOUS at 07:50

## 2022-01-01 RX ADMIN — MEROPENEM 1000 MG: 1 INJECTION, POWDER, FOR SOLUTION INTRAVENOUS at 02:30

## 2022-01-01 RX ADMIN — ACYCLOVIR SODIUM 375 MG: 50 INJECTION, SOLUTION INTRAVENOUS at 15:44

## 2022-01-01 RX ADMIN — LORAZEPAM 1 MG: 2 INJECTION INTRAMUSCULAR at 19:05

## 2022-01-01 RX ADMIN — LORAZEPAM 1 MG: 2 INJECTION INTRAMUSCULAR; INTRAVENOUS at 21:43

## 2022-01-01 RX ADMIN — HALOPERIDOL LACTATE 5 MG: 5 INJECTION, SOLUTION INTRAMUSCULAR at 14:39

## 2022-01-01 RX ADMIN — DEXMEDETOMIDINE HYDROCHLORIDE 0.9 MCG/KG/HR: 100 INJECTION, SOLUTION INTRAVENOUS at 16:48

## 2022-01-01 RX ADMIN — LEVOTHYROXINE SODIUM 112 MCG: 0.11 TABLET ORAL at 15:49

## 2022-01-01 RX ADMIN — SODIUM CHLORIDE, PRESERVATIVE FREE 10 ML: 5 INJECTION INTRAVENOUS at 20:54

## 2022-01-01 RX ADMIN — NYSTATIN 500000 UNITS: 100000 SUSPENSION ORAL at 21:28

## 2022-01-01 RX ADMIN — DEXMEDETOMIDINE HYDROCHLORIDE 0.2 MCG/KG/HR: 100 INJECTION, SOLUTION INTRAVENOUS at 10:31

## 2022-01-01 RX ADMIN — NOREPINEPHRINE BITARTRATE 2 MCG/MIN: 1 INJECTION, SOLUTION, CONCENTRATE INTRAVENOUS at 00:22

## 2022-01-01 RX ADMIN — DEXTROSE MONOHYDRATE: 50 INJECTION, SOLUTION INTRAVENOUS at 21:06

## 2022-01-01 ASSESSMENT — PAIN SCALES - GENERAL
PAINLEVEL_OUTOF10: 4
PAINLEVEL_OUTOF10: 0
PAINLEVEL_OUTOF10: 5

## 2022-01-04 PROBLEM — D61.818 PANCYTOPENIA (HCC): Status: ACTIVE | Noted: 2022-01-01

## 2022-01-04 NOTE — CONSULTS
[facility]  Palliative Medicine Consultation Note      Date Of Admission:1/4/2022  Date of consult: 01/04/22  Seen by [facility] in the past:  No    Recommendations:        Saw pt at the bedside, he is confused, restless and restrained. D/w RN at bedside, who reports pt has been agitated and non verbal for her. Pt calmer now after receiving prn haldol. D/w Phyllis FRANCOIS. Called pt's sister, Brigette Caceres, introduced palliative care. Discussed her understanding of pt's condition. She reports that she understands that pt is quite sick. She is still hopeful for recovery, but understands that may not be the case. Provided emotional support. Trey Suazossom reports that she believes pt completed a HCPOA at [facility] and named her as the primary agent. Will call REAGAN Mai to try to obtain a copy. Pt is not , has three adult children who live out of state who would be pt's legal NOK. Their names are [facility] and [facility]. Explained to Teodoromon Josiane that if we are unable to locate [facility], would have to go to pt's children for medical decision making, however the children have the option of deferring to her. 1. Goals of Care/Advanced Care planning/Code status: Limited - DNR/DNI, continue with current management. Will continue to follow for goals of care discussions. 2. Pain: Pt unable to state, does not appear to be in pain  3. SOB: Pt on 2L NC  4. Follicular Lymphoma, now with pancytopenia of unclear cause: Management as per primary team, d/w APRN today  5. Agitation of unclear cause: pt calmer today on exam, but remains disoriented requiring restraints. D/w RN at bedside, prn Haldol appears to have been effecting.    6. Disposition: TBD pending hospital course    Reason for Consult:         [x]  Goals of Care  [x]  Code Status Discussion/Advanced Care Planning   []  Psychosocial/Family Support  []  Symptom Management  []  Other (Specify)    Requesting Physician: Dr. Katerina Joaquin, APRN    CHIEF COMPLAINT:  AMS, pancytopenia and ELIO    History Obtained From:  electronic medical record, reason patient could not give history:  altered mental status    History of Present Illness:         Trixie Ivy is a 64 y.o. male with PMH of follicular NHL diagnosed in 2007 with relapse this October who presented with AMS, pancytopenia and ELIO from OSH. Pt initially presented to [facility] 12/22 with epistaxis after falling a few days prior. He also reported feeling fatigued, dizzy, and was pallorous with generalized petechiae at that time. He reported hitting his head, neck, back, chest & abdomen and also had laceration to his chin on presentation. He underwent CT scans d/t trauma, however there were no apparent fractures or other traumatic injuries. Regarding lymphoma, CT scans were reported to be c/w mixed response to 1 cycle of BR, when compared to pre-tx scans done in October. Labwork was significant for profound thrombocytopenia & anemia of unclear etiology (Hgb 7.3, Plts 15, previous Hgb 12.2 & Plt 130 on 12/13/21 at [facility]). He was also found to have Elio (SCr 1.77) hypercalcemia (10.5) and lactic acidosis (3.3). He was then admitted for further evaluation. Pt progressively deteriorated throughout that admission, developed encephalopathy     Subjective:         Past Medical History:        Diagnosis Date    Cancer ([facility] [address]     Lymphoma    Hyperlipidemia     Neurological disorder        Past Surgical History:    No past surgical history on file. Current Medications:    Medications Prior to Admission: Multiple Vitamins-Minerals (THERAPEUTIC MULTIVITAMIN-MINERALS) tablet, Take 1 tablet by mouth daily. levothyroxine (SYNTHROID) 25 MCG tablet, Take 25 mcg by mouth Daily. ondansetron (ZOFRAN) 8 MG tablet, Take 8 mg by mouth every 8 hours as needed for Nausea or Vomiting. folic acid (FOLVITE) 1 MG tablet, Take 1 mg by mouth daily.   sertraline (ZOLOFT) 100 MG tablet, Take 100 mg by mouth daily.  traMADol-acetaminophen (ULTRACET) 37.5-325 MG per tablet, Take 1 tablet by mouth every 6 hours as needed for Pain. Allergies:  Patient has no known allergies. Social History:    · TOBACCO: reports that he has been smoking. He has never used smokeless tobacco.  · ETOH:   has no history on file for alcohol use. · Patient currently lives alone    Review of Systems -   Review of Systems   Unable to perform ROS: Mental status change     Objective:          Physical Exam  Constitutional:       Appearance: He is ill-appearing. Cardiovascular:      Rate and Rhythm: Normal rate. Heart sounds: Normal heart sounds. Pulmonary:      Breath sounds: Normal breath sounds. Abdominal:      General: Bowel sounds are normal.      Palpations: Abdomen is soft. Musculoskeletal:      Right lower leg: Edema present. Left lower leg: Edema present. Skin:     General: Skin is warm and dry. Neurological:      Mental Status: He is alert. He is disoriented. Palliative Performance Scale:  [] 60% Ambulation reduced; Significant disease; Can't do hobbies/housework; intake normal or reduced; occasional assist; LOC full/confusion  [] 50% Mainly sit/lie; Extensive disease; Can't do any work; Considerable assist; intake normal  Or reduced; LOC full/confusion  [x] 40% Mainly in bed; Extensive disease; Mainly assist; intake normal or reduced; occasional assist; LOC full/confusion  [] 30% Bed Bound; Extensive disease; Total care; intake reduced; LOC full/confusion  [] 20% Bed Bound; Extensive disease; Total care; intake minimal; Drowsy/coma  [] 10% Bed Bound; Extensive disease; Total care; Mouth care only; Drowsy/coma  [] 0% Death    PPS: 40     Vitals:    /69   Pulse 113   Resp 24   Ht 5' 10.87\" (1.8 m)   Wt 203 lb 0.7 oz (92.1 kg)   SpO2 90%   BMI 28.43 kg/m²     Labs:    BMP: No results for input(s): NA, K, CL, CO2, BUN, CREATININE, GLUCOSE in the last 72 hours.     Invalid input(s):  CA, PHOS  CBC: No results for input(s): WBC, HGB, HCT, PLT in the last 72 hours. LFT's: No results for input(s): AST, ALT, ALB, BILITOT, ALKPHOS in the last 72 hours. Troponin: No results for input(s): TROPONINI in the last 72 hours. BNP: No results for input(s): BNP in the last 72 hours. ABGs: No results for input(s): PHART, TQE9FEP, PO2ART in the last 72 hours. INR: No results for input(s): INR in the last 72 hours. U/A:No results for input(s): NITRITE, COLORU, PHUR, LABCAST, WBCUA, RBCUA, MUCUS, TRICHOMONAS, YEAST, BACTERIA, CLARITYU, SPECGRAV, LEUKOCYTESUR, UROBILINOGEN, BILIRUBINUR, BLOODU, GLUCOSEU, AMORPHOUS in the last 72 hours. Invalid input(s): KETONESU    XR CHEST (2 VW)    (Results Pending)   CT CHEST ABDOMEN PELVIS WO CONTRAST    (Results Pending)   IR LUMBAR PUNCTURE FOR DIAGNOSIS    (Results Pending)   MRI BRAIN WO CONTRAST    (Results Pending)   VL Extremity Venous Bilateral    (Results Pending)         Conclusion/Time spent:         Recommendations see above    Time spent with patient and/or family: 20  Time reviewing records: 10 min   Time communicating with staff: 5 min     A total of 35 minutes spent with the patient and family on unit greater than 50% in counseling regarding palliative care and in goals of care for the patient. Thank you to Dr. Benson Aly for this consultation. We will continue to follow Mr. Everett's care as needed.     [address] [facility]  Inpatient Palliative Care  [phone]

## 2022-01-04 NOTE — CONSULTS
Neurology & Neurocritical Care Consult Note    Amilcar Bard FRANCOIS requesting this consult. CC:  Reason for Consult: Acutely altered mental status  HPI:   Sanjay Adjutant is a 64 y.o.  with PHx sig for follicular NHL, spinal cord compression (associated with NHL 2007 & 2012), anxiety and hypothyroidism. History obtained per chart review, patient encephalopathic. Patient has undergone several rounds of radiation and had BR chemotherapy in 2012, 2018, and most recently 11/2021. Recently had evidence of relapse this past October with progressive lymphadenopathy and new scalp lesion that was concerning for relapse. He was restarted on BR treatment on 11/15/21. His second infusion was delayed but prior to being able to receive his second infusion he presented to an OSH for refractory epistaxis after he fell a few days before. Reported to be fatigued and dizzy at that time and was noted to have generalized petechiae. He continued to become pancytopenic at the OSH and his mental status worsened significantly during his time there. He was admitted to [facility] for further management. On exam patient is able to turn head towards examiner when his name is called but does not follow commands or attempt to speak. Metabolic work up in process, MRI brain and LP ordered by oncology team.    PAST MEDICAL HISTORY:   has a past medical history of Cancer ([address] Hyperlipidemia, and Neurological disorder. Patient Active Problem List   Diagnosis    Lymphoma, follicular ([address]    Secondary cancer of bone ([address]    Therapy    Herpes zoster without complication    Non-Hodgkin lymphoma ([address]    Cancer, metastatic to bone ([address]    Pancytopenia ([address]       SURGICAL HISTORY:  No past surgical history on file. FAMILY HISTORY:  family history includes Alcohol Abuse in his father; Colbyjaquelin Vanesa in his mother. SOCIAL HISTORY:  he reports that he has been smoking.  He has never used smokeless tobacco.    Social History     Socioeconomic History    Marital status: Unknown     Spouse name: Not on file    Number of children: Not on file    Years of education: Not on file    Highest education level: Not on file   Occupational History    Not on file   Tobacco Use    Smoking status: Current Every Day Smoker    Smokeless tobacco: Never Used   Substance and Sexual Activity    Alcohol use: Not on file    Drug use: Not on file    Sexual activity: Not on file   Other Topics Concern    Not on file   Social History Narrative    Not on file     Social Determinants of Health     Financial Resource Strain:     Difficulty of Paying Living Expenses: Not on file   Food Insecurity:     Worried About Running Out of Food in the Last Year: Not on file    Radha of Food in the Last Year: Not on file   Transportation Needs:     Lack of Transportation (Medical): Not on file    Lack of Transportation (Non-Medical):  Not on file   Physical Activity:     Days of Exercise per Week: Not on file    Minutes of Exercise per Session: Not on file   Stress:     Feeling of Stress : Not on file   Social Connections:     Frequency of Communication with Friends and Family: Not on file    Frequency of Social Gatherings with Friends and Family: Not on file    Attends Protestant Services: Not on file    Active Member of [address] [place] [address] [employer] or Organizations: Not on file    Attends Club or Organization Meetings: Not on file    Marital Status: Not on file   Intimate Partner Violence:     Fear of Current or Ex-Partner: Not on file    Emotionally Abused: Not on file    Physically Abused: Not on file    Sexually Abused: Not on file   Housing Stability:     Unable to Pay for Housing in the Last Year: Not on file    Number of [place] in the Last Year: Not on file    Unstable Housing in the Last Year: Not on file       HOME MEDICATIONS:  Medications Prior to Admission: Multiple Vitamins-Minerals (THERAPEUTIC MULTIVITAMIN-MINERALS) tablet, Take 1 tablet by mouth daily.  levothyroxine (SYNTHROID) 25 MCG tablet, Take 25 mcg by mouth Daily. ondansetron (ZOFRAN) 8 MG tablet, Take 8 mg by mouth every 8 hours as needed for Nausea or Vomiting. folic acid (FOLVITE) 1 MG tablet, Take 1 mg by mouth daily. sertraline (ZOLOFT) 100 MG tablet, Take 100 mg by mouth daily. traMADol-acetaminophen (ULTRACET) 37.5-325 MG per tablet, Take 1 tablet by mouth every 6 hours as needed for Pain.     CURRENT MEDICATIONS:    Current Facility-Administered Medications:     0.9 % sodium chloride infusion, , IntraVENous, Continuous PRN, Veto Pilsner, APRN - CNP    0.9 % sodium chloride infusion, 25 mL, IntraVENous, PRN, Veto Pilsner, APRN - CNP    alteplase (CATHFLO) injection 2 mg, 2 mg, IntraCATHeter, PRN, Veto Pilsner, APRN - CNP    magnesium hydroxide (MILK OF MAGNESIA) 400 MG/5ML suspension 10 mL, 10 mL, Oral, Daily PRN, Veto Pilsner, APRN - CNP    magnesium sulfate 4000 mg in 100 mL IVPB premix, 4,000 mg, IntraVENous, PRN, Veto Pilsner, APRN - CNP    potassium chloride 20 mEq/50 mL IVPB (Central Line), 20 mEq, IntraVENous, PRN, Veto Pilsner, APRN - CNP    Saline Mouthwash 15 mL, 15 mL, Swish & Spit, 4x Daily AC & HS, Veto Pilsner, APRN - CNP    Saline Mouthwash 15 mL, 15 mL, Swish & Spit, Q4H PRN, Veto Pilsner, APRN - CNP    sodium chloride flush 0.9 % injection 5-40 mL, 5-40 mL, IntraVENous, 2 times per day, Veto Pilsner, APRN - CNP    sodium chloride flush 0.9 % injection 5-40 mL, 5-40 mL, IntraVENous, PRN, Veto Pilsner, APRN - CNP    diphenhydrAMINE (BENADRYL) tablet 25 mg, 25 mg, Oral, Nightly PRN, Veto Pilsner, APRN - CNP    acetaminophen (TYLENOL) tablet 650 mg, 650 mg, Oral, Q4H PRN, Veto Pilsner, APRN - CNP    0.9 % sodium chloride infusion, 25 mL, IntraVENous, PRN, Veto eFrn, APRN - CNP    0.9 % sodium chloride infusion, , IntraVENous, PRN, Vetito Shirley, APRN - CNP    dextrose 5 % solution, , IntraVENous, Continuous, PRISCILA Soriano CNP    haloperidol lactate (HALDOL) injection 5 mg, 5 mg, IntraVENous, Q6H PRN, PRISCILA Soriano CNP, 5 mg at 01/04/22 1439    lidocaine PF 1 % injection 5 mL, 5 mL, IntraDERmal, Once, PRISCILA Soriano CNP    sodium chloride flush 0.9 % injection 5-40 mL, 5-40 mL, IntraVENous, 2 times per day, PRISCILA Soriano CNP    sodium chloride flush 0.9 % injection 5-40 mL, 5-40 mL, IntraVENous, PRN, PRISCILA Soriano CNP    sertraline (ZOLOFT) tablet 100 mg, 100 mg, Oral, Daily, PRISCILA Soriano CNP  [facility] ON 1/5/2022] levothyroxine (SYNTHROID) tablet 112 mcg, 112 mcg, Oral, Daily, PRISCILA Soriano CNP    pantoprazole (PROTONIX) injection 40 mg, 40 mg, IntraVENous, Daily, PRISCILA Soriano CNP    ondansetron (ZOFRAN) injection 4 mg, 4 mg, IntraVENous, Q6H PRN, PRISCILA Soriano CNP    polyethylene glycol (GLYCOLAX) packet 17 g, 17 g, Oral, Daily PRN, PRISCILA Soriano CNP    fluconazole (DIFLUCAN) tablet 100 mg, 100 mg, Oral, Daily, PRISCILA Soriano CNP    valACYclovir (VALTREX) tablet 500 mg, 500 mg, Oral, Daily, PRISCILA Soriano CNP    meropenem (MERREM) 1,000 mg in sodium chloride 0.9 % 100 mL IVPB, 1,000 mg, IntraVENous, Q12H, PRISCILA Soriano CNP    QUEtiapine (SEROQUEL) tablet 25 mg, 25 mg, Oral, BID, PRISCILA Soriano CNP      ROS:   Patient encephalopathic, ALVIN  Constitutional  /72   Pulse 107   Temp 98.8 °F (37.1 °C) (Axillary)   Resp 27   Ht 5' 10.87\" (1.8 m)   Wt 203 lb 0.7 oz (92.1 kg)   SpO2 94%   BMI 28.43 kg/m²     General Alert, no distress, well-nourished  Cardiovascular: Rate and rhythm regular  Respiratory: Unlabored respiratory pattern    Neurologic  Mental status:  Patient does not respond to orientation questions or follow commands    Cranial nerves:   CN2: Does not blink to visual threat  CN 3,4,6: pupils equal and reactive to light, extraocular muscles intact with oculocephalic manuever  CN5: Exam limited d/t encephalopathy  CN7:face symmetric at rest  CN8: hearing symmetric to voice, turns head toward examiner speaking  CN9: exam limited d/t encephalopathy  CN11: exam limited d/t encephalopathy  CN12: exam limited d/t encephalopathy    Motor Exam:  Withdrawals to pain in BUEs, flexion to pain in BLEs. Does not follow commands, o    Sensory: Grimaces to painful stim in all extremities  Cerebellar/coordination: patient does not understand command  Tone: normal in all 4 extremities  Gait: held for patient safety      Images:  CT head wo contrast 12/22 @ OSH:  IMPRESSION:           No intracranial hemorrhage       Left subcutaneous scalp lesion measuring 3.0 x 1.2 cm. This is of unclear clinical    significance. Malignancy cannot be entirely excluded given the patient's history of lymphoma     Assessment:  Eloy Phoenix is a 64 y.o.  with PHx sig for follicular NHL, spinal cord compression (associated with NHL 2007 & 2012), anxiety and hypothyroidism. He recently restarted treatment BR chemotherapy on 11/15/21 and was scheduled to receive his second infusion but presented to the ER at OSH prior to this with nose bleeds, dizziness, and fatigue plus a recent fall. His mental status progressively declined while at the OSH and he was transferred to [address] [place] [address] for further management.      Plan:  - Agree with MRI, LP, metabolic work up, as you are  - If he is unable to complete his MRI due to agitation could consider rechecking head CT as his last CT head was on 12/22  - Routine EEG  - Call Neurology with any exam changes    Bernarda Cogan, APRN-CNP  Neurology & Neurocritical Care   Neurology Line: [phone]  [employer]: [facility] Neurology & Neuro Critical Care NPs

## 2022-01-04 NOTE — H&P
[facility] History and Physical       Attending Physician: Erik Acuña DO    Primary Care: Michael Ceballos       Referring MD: oRe Reyez MD  [address] [place] [address],  [facility]    Name: Jessica Fischer :  1965  MRN:  [id number]    Admission: 2022      Date: 2022    Reason for Admission: Hospital to hospital transfer for altered mental status, pancytopenia, ELIO    History of Present Illness:   Kim Aguilera is a 65 yo gentleman with longstanding diagnosis of follicular NHL, first diagnosed in 2007. He has undergone several rounds of radiation and received BR chemotherapy in , , and most recently restarted tx 2021. His treatment course has been mostly unremarkable. His PMH is significant for spinal cord compression (associated with NHL  & ), anxiety, & hypothyroid. He most recently had evidence of relapse this past October with diffuse, progressive lymphadenopathy and a new scalp lesion concerning for relapse. He was encourage to have biopsy to confirm FL, however he politely declined to have this done. He was then started on treatment with BR again 11/15/21. He was most recently seen at [facility] 21 with stable blood counts, and no significant cytopenias. He apparently tolerated cycle 1 well and felt his lymphadenopathy was improving. Cycle 2 was then delayed d/t insurance issues (?) per notes and he was scheduled to return to clinic the end of December. However prior to this appt, he presented to outside ED 21 with c/o refractory epistaxis after falling a couple of days prior. He also reported feeling fatigued, dizzy, and was pallorous with generalized petechiae at that time. He reported hitting his head, neck, back, chest & abdomen and also had laceration to his chin on presentation. He underwent CT scans d/t trauma, however there were no apparent fractures or other traumatic injuries.  Regarding lymphoma, CT scans MULTIVITAMIN-MINERALS) tablet Take 1 tablet by mouth daily. Historical Provider, MD   levothyroxine (SYNTHROID) 25 MCG tablet Take 25 mcg by mouth Daily. Historical Provider, MD   ondansetron (ZOFRAN) 8 MG tablet Take 8 mg by mouth every 8 hours as needed for Nausea or Vomiting. Historical Provider, MD   folic acid (FOLVITE) 1 MG tablet Take 1 mg by mouth daily. Historical Provider, MD   sertraline (ZOLOFT) 100 MG tablet Take 100 mg by mouth daily. Historical Provider, MD   traMADol-acetaminophen (ULTRACET) 37.5-325 MG per tablet Take 1 tablet by mouth every 6 hours as needed for Pain. Historical Provider, MD       No Known Allergies    Family History   Problem Relation Age of Onset    Lung Cancer Mother     Alcohol Abuse Father         Social History     Socioeconomic History    Marital status: Unknown     Spouse name: Not on file    Number of children: Not on file    Years of education: Not on file    Highest education level: Not on file   Occupational History    Not on file   Tobacco Use    Smoking status: Current Every Day Smoker    Smokeless tobacco: Never Used   Substance and Sexual Activity    Alcohol use: Not on file    Drug use: Not on file    Sexual activity: Not on file   Other Topics Concern    Not on file   Social History Narrative    Not on file     Social Determinants of Health     Financial Resource Strain:     Difficulty of Paying Living Expenses: Not on file   Food Insecurity:     Worried About Running Out of Food in the Last Year: Not on file    Radha of Food in the Last Year: Not on file   Transportation Needs:     Lack of Transportation (Medical): Not on file    Lack of Transportation (Non-Medical):  Not on file   Physical Activity:     Days of Exercise per Week: Not on file    Minutes of Exercise per Session: Not on file   Stress:     Feeling of Stress : Not on file   Social Connections:     Frequency of Communication with Friends and Family: Not on file    Frequency of Social Gatherings with Friends and Family: Not on file    Attends Sabianism Services: Not on file    Active Member of Clubs or Organizations: Not on file    Attends Club or Organization Meetings: Not on file    Marital Status: Not on file   Intimate Partner Violence:     Fear of Current or Ex-Partner: Not on file    Emotionally Abused: Not on file    Physically Abused: Not on file    Sexually Abused: Not on file   Housing Stability:     Unable to Pay for Housing in the Last Year: Not on file    Number of [place] in the Last Year: Not on file    Unstable Housing in the Last Year: Not on file        ROS:  As noted above, otherwise remainder of 10-point ROS negative    Physical Exam:     Vital Signs: There were no vitals taken for this visit. Weight:    Wt Readings from Last 3 Encounters:   07/24/17 210 lb 12.8 oz (95.6 kg)   04/03/17 215 lb (97.5 kg)   07/11/16 222 lb 3.2 oz (100.8 kg)       KPS: 20% Very sick; hospital admission necessary; active supportive treatment necessary    ECOG PS:  (4) Completely disabled, unable to carry out self-care and confined to bed or chair    General: Disoriented & combative, unable to respond to questions. Pallorous with scattered petechiae & ecchymosis t/o  HEENT: normocephalic, PERRL, +bilateral conjunctival hemorrhages, Oral mucosa dry. Large left occipital mass  NECK: supple palpable adenopathy. Dirty, with crusting of unknown origin. SKIN: Diffuse bruising, petechiae t/o. Pitting edema to BLEs. Appears to have stool crusted to his backside. Stool crusted under his fingernails. CHEST: CTA bilaterally without use of accessory muscles  CV: Tachy, S1 S2, RRR, no MRG  ABD: NT ND normoactive BS, no palpable masses or hepatosplenomegaly  : bilateral groin LAD  NEURO: Disoriented, combative. Unable to perform neuro assessment  CATHETER: PIV only at this time    Laboratory Data:   Labs reviewed in [address].  Admission labs pending    DIAGNOSTIC IMAGIN. CT Head 21:  No intracranial hemorrhage     Left subcutaneous scalp lesion measuring 3.0 x 1.2 cm. This is of unclear clinical significance. Malignancy cannot be entirely excluded given the patient's history of lymphoma    2. CT C/A/P 21: Compared to pre-treatment CT scans performed 10/20/21  CHEST:  No acute abnormality on chest CT   Lymph node adjacent to the descending thoracic aorta which is increased in size 16 x 19 mm.  Prior 7 x 8 mm. Mild increased nodularity along the right major fissure upper lobe; 3 small new subcentimeter lung nodules the lung base.  Malignancy is a consideration    A/P:  Mixed response with progression of mid and upper abdominal conglomerate retroperitoneal adenopathy causing left-sided hydronephrosis (when compared to prior CT of 10/20/2021), and interval reduction in left pelvic sidewall adenopathy and a left inguinal   mass. 3. CT Head 21:  1. No acute intracranial abnormality. If indicated, brain MRI may be helpful for further evaluation. 2. Hyperdense material within the right maxillary sinus suggesting inspissated secretions. 3. Unchanged soft tissue mass of the left occipital calvarium. 4. Renal U/S 22:  RIGHT KIDNEY: Aure Gloria limited evaluation. No definite hydronephrosis. LEFT KIDNEY: Probable hydronephrosis but evaluation is markedly limited. RIGHT RENAL LENGTH: 11.2 cm  (normal is 9-14cm)   LEFT RENAL LENGTH:  10.2 cm    (normal is 9-14cm)   BLADDER: Unremarkable   OTHER:  None     Note: Evaluation is markedly limited as the patient was combative and with limited mobility. 5. CT C/A/P 22:  Pending    6. MRI Brain 22:  Pending    7. Echocardiogram 22:  Pending    8. BLE Dopplers 22:  Pending    PATHOLOGY:  1. BM Bx/Asp 22:  Pending    PROBLEM LIST:             1. Follicular NHL originally diagnosed 2007, most recent relapse 10/2021  2.  Spinal Cord Compression associated with original diagnosis in 2007  3. Hypothyroid  4. Anxiety  5. ELIO w/hypercalcemia  6. Metabolic Encephalopathy      TREATMENT:            1. Rad Tx to T-spine & L-spine 2007  RadTx 3000cGy / 10fractions T1-T4 9/2007  Progression? 7/2012  2. RadTx T2-L3 6/15/12, L1-L4 6/28/12  3. BR x6 cycles 7/2012-1/2013  Progression 10/2018  4. BR x6 cycles 10/2018-3/2019  Progression 10/2021  5. BR   - Cycle 1 11/15/21      ASSESSMENT AND PLAN:           1. Follicular Lymphoma: Relapsed 10/2021. Now with high concern for possible progression  - No biopsy done following relapse 10/20/21 (pt declined to have biopsy performed)  - CT scans 12/22/21: Mixed response following 1 cycle of BR (scans compared to pre-tx scans 10/20/21  - S/p BR 11/15/21. Further tx on hold until acute issues resolved or elucidated / confirmed to be r/t progression  - BM Bx 1/5/22 - pending  - May also be beneficial to consider LN or left occipital mass Bx to confirm progression    2. ID: Afebrile, however with AMS and profound neutropenia will culture and start IV abx until infection can be ruled out  - Pan Cxs 1/4/22 - pending  - Procalcitonin, CRP, Sed rate, lactic acid 1/4 - pending  - Start valtrex & fluconazole ppx while neutropenic  - Start Merrem Day +1 (1/4/22)  - Consider CNS dosing pending neurology recs    3. Heme: Pancytopenia of unclear etiology, likely 2/2 underlying heme malignancy  - Transfuse for Hgb < 7 and Platelets < 10 K  - Plt transfusion today for central line placement  - BM Bx/Asp 1/5/22 - pending  - Repeat hemolysis w/u: Retics, Haptoglobin 1/4/22 pending  - DIC w/u: Check d-dimer & fibrinogen normal 1/4/22 pending  - TTP w/u (ELIO, AMS, thrombocytopenia): check smear for schistocytes 1/4/22 pending; may need plasma exchange  - Need to also consider [place], although highly unlikely without fever or elevated transaminases - will check fibrinogen, ferritin, triglycerides 1/4/22 pending.  If further suspicion will also check NK cell activity, soluble CD25    4. Metabolic: ELIO, HyperCa,  HyperNa  - Dr. Frances Martinez following previously [facility]. Will reconsult here  - F/c in place  Risk for Tumor Lysis Syndrome:  - LDH >6000 12/23, this does not appear to have been rechecked since then   - Check TLS labs on admit and at least daily for now. May need to increase to BID if grossly abnormal  HyperCa: I suspect this is r/t lymphoma relapse with likely bony involvement given his history  - PTH low 12/31  - SPEP, PTHrp pending   - Zometa given 1/4/22  HyperNa:  - Start IVFs: D5W at 200mL/hr  - Renal panel q6h    5. Metabolic Encephalopathy: multifactorial, concerned this is r/t possible progression  - Neurology consult 1/4/22 - pending  - Ammonia reported at 70 12/29, cannot see that it was rechecked. Hold lactulose and recheck here  - HyperCa s/p zometa 1/4/22  - Needs LP, however will need to correct thrombocytopenia first, will attempt to schedule for 1/5 AM  - MRI stat 1/4/22 - pending  - Cont restraints for now  - Haldol PRN    6. GI: Elevated bilirubin - lymphoma infiltrate vs other? Elevated LFTs:  - Bili 3.1, normal transaminases  - Hepatitis panel 1/1 - Neg  Hyperammonemia:  - Hold lactulose for now  - Follow ammonia daily  Nutrition:  NPO d/t altered menation  - Consult SLP when appropriate  - Insert corpak to assist with meds & EN  - Consult dietitian for EN mgmt    7. : progressive RP LAD w/left-sided hydro  - Repeating CT scans 1/4/22, consider urology consult if still present    8. Endo: H/o hypothyroid:  - Cont synthroid 112mcg daily  - Check TSH/T4 on admit here 1/4/22 - pending    9. IV Access: PIV only on transfer  - Will place central line on admit after coagulopathies addressed    10. Acute Debilitation: 2/2 acute medical issues  - Consult SLP/PT/OT once improved. Not appropriate at this time    11.  Code Status: Limited code, continue current level of care  - Per sister Jolanta Ocampo, [phone]), pt had previously stated he would not want to be resuscitated should he experience cardiopulmonary arrest  - Changed code status to limited code, no to all but vasopressors. Continue aggressive care for now  - Consulted Palliative care      - DVT Prophylaxis: Platelets <82,347 cells/dL - prophylactic lovenox on hold and mechanical prophylaxis with bilateral SCDs while in bed in place. Contraindications to pharmacologic prophylaxis: Thrombocytopenia  Contraindications to mechanical prophylaxis: None      - Disposition: Uncertain at this time. The patient was seen and examined by Dr. Carson Alvarez. This admission history and physical has been discussed and agreed upon by Dr. Carson Alvarez.     Shaquille Orozco, APRN - CNP

## 2022-01-04 NOTE — PROGRESS NOTES
Per [facility]. pt does not follow commands and does not like to be touched for routine EEG. Per Luis E Garcia N.PCatlaino (R) EEG on hold for metabolic work up and MRI.

## 2022-01-04 NOTE — CONSULTS
Nephrology Consult Note          [facility]      [email or web address]         Phone: [phone]      1/4/2022 1:47 PM     Patient: Ramona Herndon [id number]  [id number]  Date of Admit: 1/4/2022 LOS: 0 days  Referring physician: Dr Noelle Thorne:  ELIO is most likely due to ATN hemodynamics + severe anemia + IV volume depletion + NSAIDs use  + possible obstruction   Patient has minimal proteinuria on UA and no RBC or WBC on last UA    Recheck urine studies. start D 5 % IVF and primary team will recheck imaging. Check PTH,  PTHr P, vit D, Multiple Myeloma workup up  Rule out TTP  Avoid Gadolinium contrast. If need to use it, then please call us prior to that. Avoid nephrotoxins  Monitor input, output and daily weight  Renally dose all medications  Repeat labs    Discussed with primary team and nurse at bedside. Thank you for allowing us to participate in this patient's care    In case of any question please call us at our 24 hour answering service [phone] or from 7 AM to 5 PM via [employer] or cell number    Krissy Alcantar MD  [facility]  [facility]  [place], [address]  Fax: [phone]  Office: [phone]       Assessment & Plan     Renal function:  Acute kidney Injury:     Baseline Cr: In April 2021 was 0.91  Cr on 12/22 at [facility] was 1.7 and it is progressively getting worse and last one on 1/4 was 2.44    UA: 12/23 Protein trace, No Blood or WNC  Renal imaging: CT 12/22/21  Similar left-sided hydronephrosis related to obstructing retroperitoneal adenopathy     Nephrotoxic exposures including NSAIDs use or contrast exposure: Patient was taking NSAIDs.    Recent Antibiotics use: No  Hemodynamic insults: ?  Recent Vascular procedure: No  Known autoimmune disease: No  Echo: No recent test  Known Cirrhosis: No  Current infection or sepsis: No    ELIO is most likely due to ATN hemodynamics + severe anemia + IV volume depletion + NSAIDs use  + possible obstruction   Patient has minimal proteinuria on UA and no RBC or WBC on last UA    Recheck urine studies. start D 5 % IVF and primary team will recheck imaging. Rule out TTP    Avoid Gadolinium contrast. If need to use it, then please call us prior to that. Electrolytes:  1) Hypernatremia    2) Hypercalcemia. Check PTH,  PTHr P, vit D, Multiple Myeloma workup up      Lab Results   Component Value Date    CREATININE 0.95 07/24/2017    BUN 9 07/24/2017     07/24/2017    K 3.7 07/24/2017     07/24/2017    CO2 24 07/24/2017      Lab Results   Component Value Date    CALCIUM 9.2 07/24/2017         Acid/Base status:  Stable. Volume status/BP:  BP stable. Hematology:   Pancytopenia. Lab Results   Component Value Date    WBC 6.5 07/24/2017    HGB 13.7 (L) 07/24/2017    HCT 43.3 (L) 07/24/2017    MCV 91.9 07/24/2017     07/24/2017           Reason for Consult     Reason for consult: ELIO        History of Present Illness   Marcia Grimes is a 64 y.o. 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 He has undergone several rounds of radiation and received BR chemotherapy in 2012, 2018, and most recently restarted tx 11/2021. His treatment course has been mostly unremarkable. His PMH is significant for spinal cord compression (associated with NHL 2007 & 2012), anxiety, & hypothyroid. Patient was admitted at [facility] ED on 12/22/21 with c/o refractory epistaxis after falling a couple of days prior. There was report of  feeling fatigued, dizzy, and was pallorous with generalized petechiae at that time. He reported hitting his head, neck, back, chest & abdomen and also had laceration to his chin on presentation. He underwent CT scans d/t trauma, however there were no apparent fractures or other traumatic injuries. Regarding lymphoma, CT scans were reported to be c/w mixed response to 1 cycle of BR, when compared to pre-tx scans done in October.  Labwork was significant for profound thrombocytopenia & anemia and ELIO with Cr of 1.7, hypercalcemia 10.5 and lactic acidosis. During stay at [place] [address] patient became encephalopathic. Now patient was transferred here for further management of his Lymphoma and team will plan for bone marrow biopsy. There was also report of NSAIDs use at home prior to admission. Review of Systems     Unable. Past Medical History     Past Medical History:   Diagnosis Date    Cancer [facility])     Lymphoma    Hyperlipidemia     Neurological disorder          Past Surgical History     No past surgical history on file. Family History     Family History   Problem Relation Age of Onset    Lung Cancer Mother     Alcohol Abuse Father          Social History     Social History     Tobacco Use    Smoking status: Current Every Day Smoker    Smokeless tobacco: Never Used   Substance Use Topics    Alcohol use: Not on file          Past medical, family, and social histories were reviewed as previously documented. Updates were made as necessary. Inpatient Medications and Allergies       Scheduled Meds:   Saline Mouthwash  15 mL Swish & Spit 4x Daily AC & HS    sodium chloride flush  5-40 mL IntraVENous 2 times per day    meropenem  1,000 mg IntraVENous Q8H    fluconazole  200 mg Oral Daily    lidocaine 1 % injection  5 mL IntraDERmal Once    sodium chloride flush  5-40 mL IntraVENous 2 times per day    valACYclovir  500 mg Oral BID         Allergies: No Known Allergies      Vital Signs and Input Output     Vitals:    01/04/22 1313   BP: 114/69   Pulse: 113   Resp: 24   SpO2: 90%         No intake or output data in the 24 hours ending 01/04/22 1347        Physical Exam     General appearance: NAD  Head: Normocephalic, without obvious abnormality, atraumatic   Mouth: Dry mucous membrane  Neck: Supple.    Lungs: Clear to auscultation bilaterally and no respiratory distress on 2 liters oxygen  Heart: S1, S2.   Abdomen: soft, non-distended  Extremities: + edema  Psych: Calm  Neuro: Confused and opens eyes to commands.        Laboratory Data   See above    Diagnostic Studies   Pertinent images reviewed

## 2022-01-04 NOTE — CONSULTS
Comprehensive Nutrition Assessment    RECOMMENDATIONS:  1. PO DIET: Continue Diet NPO   2. Recommend EN formula Standard Formula with Fiber  Jevity 1.5 @ goal rate 60 ml/hr to provide 1440 ml total volume, 2160 kcal, 92g protein and 1094 ml free water. 3. Initiate EN @20 mL/hr and as tolerated, increase by 25 mL/hr q 4 hours until goal of 60 mL/hr is met. 4. Maintenance water flush of 30 ml every 4 hours  5. Recommend 1 protein modular QD via feeding tube. Flush with 30 ml water before/after administration. Do not mix with tube feeding formula. NUTRITION ASSESSMENT:   Type and Reason for Visit:  Type and Reason for Visit: Consult   Nutritional summary & status: RD consult for TF Ordering & Mgt d/t pt unable to have PO diet r/t mental status. RD adding orders and will follow for nutritional adequacy through admit. 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 He has undergone several rounds of radiation and received BR chemotherapy in 2012, 2018, and most recently restarted tx 11/2021. PMH significant for spinal cord compression (associated with NHL 2007 & 2012), anxiety, & hypothyroid    MALNUTRITION ASSESSMENT  Context of Malnutrition: Acute Illness   Malnutrition Status:  At risk for malnutrition (Comment)  Findings of the 6 clinical characteristics of malnutrition (Minimum of 2 out of 6 clinical characteristics is required to make the diagnosis of moderate or severe Protein Calorie Malnutrition based on AND/ASPEN Guidelines):  Energy Intake: Less than/equal to 50% of estimated energy requirements    Energy Intake Time: Unable to assess     NUTRITION DIAGNOSIS   · Inadequate oral intake related to cognitive or neurological impairment as evidenced by NPO or clear liquid status due to medical condition      [address] [facility] [address] and/or Nutrient Delivery:  Start Tube Feeding,Continue NPO  Nutrition Education/Counseling:  No recommendation at this time   Goals:  pt will tolerate EN at goal rate to meet > 75% of nutrition needs while NPO. Nutrition Monitoring and Evaluation:   Food/Nutrient Intake Outcomes:  Enteral Nutrition Intake/Tolerance  Physical Signs/Symptoms Outcomes:  Biochemical Data,Weight     OBJECTIVE DATA: Significant to nutrition assessment  · Nutrition-Focused Physical Findings: Nutrition Related Findings: recent brain XRT; lbm pta; encephalopathic    · Labs: Reviewed;   · Meds: Reviewed;   · Wounds: Wound Type: None     CURRENT NUTRITION THERAPIES  Diet NPO     Additional Calorie Sources:   1000 mL D5 = 200 kcal   PO Intake: Average Meal Intake: NPO   PO Supplement Intake:Average Supplements Intake: NPO  IVF: D5 @ 200 ml/hr (1L bag)    ANTHROPOMETRICS  Current Height: 5' 10.87\" (180 cm)  Current Weight: 203 lb 0.7 oz (92.1 kg)    Admission weight: 203 lb 0.7 oz (92.1 kg)  Ideal Body Weight (lbs) (Calculated): 171 lbs (Ideal Body Weight (Kg) (Calculated): 78 kg)  Usual Bodyweight noe   Weight Changes noe limited wt hx       BMI BMI (Calculated): 28.5    Wt Readings from Last 50 Encounters:   01/04/22 203 lb 0.7 oz (92.1 kg)   07/24/17 210 lb 12.8 oz (95.6 kg)       COMPARATIVE STANDARDS  Energy (kcal):  2280-9751 (20-25); Weight Used for Energy Requirements:  Current (92.1 kg )     Protein (g):  117-140 (1.8-2.0 recent brain XRT ); Weight Used for Protein Requirements:  Ideal (78)        Fluid (ml/day):  0192-2692; Method Used for Fluid Requirements:  1 ml/kcal      The patient will still be monitored per nutrition standards of care. Consult dietitian if nutrition interventions essential to patient care is needed.      Michael Baum, [address],   [place]:  [phone]  Office:  [phone]

## 2022-01-04 NOTE — CONSULTS
Department of General Surgery  Surgical Service    Line Consultation    Reason for Consult: Access     CHRISTIANO Renae is a 64 y.o. male recently admitted on 1/4 with AMS, pancytopenia, and ELIO in setting of follicular NHL. Patient requires triple lumen central line placement for vascular access. Therefore general surgery has been consulted for assistance with access. Previous central line attempts this admission: none  Central line attempt prior to this admission: unclear, none seen on chart review, patient with AMS. Current Access: PIV x1  Anticoagulation: none  Antiplatelet: none  INR: 1.95 - received 1 unit FFP prior to placement  Platelet Count : 22 - received 2 packs platelets      Past Medical History:   has a past medical history of Cancer ([facility] [address] Hyperlipidemia, and Neurological disorder. Past Surgical History:   has no past surgical history on file. Medications:  Prior to Admission medications    Medication Sig Start Date End Date Taking? Authorizing Provider   Multiple Vitamins-Minerals (THERAPEUTIC MULTIVITAMIN-MINERALS) tablet Take 1 tablet by mouth daily. Historical Provider, MD   levothyroxine (SYNTHROID) 25 MCG tablet Take 25 mcg by mouth Daily. Historical Provider, MD   ondansetron (ZOFRAN) 8 MG tablet Take 8 mg by mouth every 8 hours as needed for Nausea or Vomiting. Historical Provider, MD   folic acid (FOLVITE) 1 MG tablet Take 1 mg by mouth daily. Historical Provider, MD   sertraline (ZOLOFT) 100 MG tablet Take 100 mg by mouth daily. Historical Provider, MD   traMADol-acetaminophen (ULTRACET) 37.5-325 MG per tablet Take 1 tablet by mouth every 6 hours as needed for Pain. Historical Provider, MD       Allergies:  Patient has no known allergies. Family History:  family history includes Alcohol Abuse in his father; Herschell [place] in his mother. Social History:   reports that he has been smoking.  He has never used smokeless tobacco.     REVIEW OF SYSTEMS: A 5 point review of systems was completed     PHYSICAL EXAM:    VITALS:  /72   Pulse 107   Temp 98.8 °F (37.1 °C) (Axillary)   Resp 27   Ht 5' 10.87\" (1.8 m)   Wt 203 lb 0.7 oz (92.1 kg)   SpO2 94%   BMI 28.42 kg/m²     CONSTITUTIONAL Lethargic and altered   HEENT No palpable lymphadenopathy, anicteric   LUNGS No increased work of breathing, good air exchange   CV Regular rate and rhythm                               DATA:    XR CHEST (2 VW)    (Results Pending)   CT CHEST ABDOMEN PELVIS WO CONTRAST    (Results Pending)   IR LUMBAR PUNCTURE FOR DIAGNOSIS    (Results Pending)   MRI BRAIN WO CONTRAST    (Results Pending)   VL Extremity Venous Bilateral    (Results Pending)           ASSESSMENT AND PLAN:    Bernardo Ireland is a 64 y.o.  male who presents with AMS, ELIO and pancytopenia in picture of follicular NHL. · Patient requires central access for vascular access  · Plan to attempt line placement of R IJ(site of placement/ and rationale)  · Case discussed with Dr. Kalen Mckenna.      Rajan Gonzalez DO  3:19 PM  1/4/2022

## 2022-01-05 NOTE — PROCEDURES
Procedure: Bone Marrow Biopsy and Needle Aspirate   Indication: Pancytopenia    Anesthesia:  Lidocaine 1% 10 mL    Patient given risks and benefits of procedure. Consent signed and time out performed. Patient placed in the left lateral decubitus position. Area cleaned and prepped in a sterile fashion with chloraprep. Sterile drape applied. Lidocaine 1% -10ml administered to the subcutaneous tissue and periosteimum of the right iliac crest. Using sterile technique and using an aspirate needle a bone marrow aspirate was performed. A puncture was made with the provided scalpel, then using an Jamshidi needle, a biopsy was taken from the right posterior iliac crest. A sterile bandage was applied. No significant bleeding. Sample sent for histology, flow cytometry, FISH.    Patient tolerated procedure well while on Precedex     Estimated Blood Loss: < 5 mL    Melodie Lindquist, APRN - CNP

## 2022-01-05 NOTE — PROGRESS NOTES
Behavioral Health Intervention Note    Called and spoke with patient's sister to gather information about patient's social circumstances. Sister reports that patient is on disability for his non-Hodgkin's lymphoma. He lives with his sister. Reports that he moved in with her from [place] after his divorce years ago. She states that he has three adult children (2 in [place] and 1 in [place]). She reports that one daughter in [place] is aware of his situation. Sister states that both of his parents are . She reports that patient uses a walker at home, but is otherwise able to fully care for himself. Sister reports that power of  paperwork was completed at [facility]. Paperwork was not received, but will be requested.     Interventions: Assessment of current needs, Collaborating with family members

## 2022-01-05 NOTE — PROGRESS NOTES
Patient admitted to [address] via stretcher/ambulance from [facility] for diagnosis of lymphona. Patient placed in bilateral wrist restraints at time of arrival due to being combative and pulling on lines. Admission assessment completed - see admission flowsheet documentation. Patient is a high fall risk. Safety measures instituted per policy. At time of arrival pt was completely bathed due to patient having stool caked on rectum and down legs, stool appeared to be old. Fernandes was removed and then a new on placed. PIV was removed due to being infiltrated and a new one placed. Mepilex placed on pt at this time. Pt refusing mouth care at this time. CT chest/abd/ pelvis and 2 view chest xray completed. Echo and bilat lower extremity doppler completed. Labs collected.

## 2022-01-05 NOTE — PROGRESS NOTES
Initial Chief Complaint/Reason for Consult: Acutely altered mental status    Interval History:  Patient remains encephalopathic, not following commands, agitated with stimulation. History of Present Illness:  Marcia Grimes is a 64 y.o.  with PHx sig for follicular NHL, spinal cord compression (associated with NHL 2007 & 2012), anxiety and hypothyroidism.      History obtained per chart review, patient encephalopathic. Patient has undergone several rounds of radiation and had BR chemotherapy in 2012, 2018, and most recently 11/2021. Recently had evidence of relapse this past October with progressive lymphadenopathy and new scalp lesion that was concerning for relapse. He was restarted on BR treatment on 11/15/21. His second infusion was delayed but prior to being able to receive his second infusion he presented to an OSH for refractory epistaxis after he fell a few days before. Reported to be fatigued and dizzy at that time and was noted to have generalized petechiae. He continued to become pancytopenic at the OSH and his mental status worsened significantly during his time there. He was admitted to [facility] for further management. On exam patient is able to turn head towards examiner when his name is called but does not follow commands or attempt to speak.  Metabolic work up in process, MRI brain and LP ordered by oncology team.    Review of Systems:   ALVIN d/t encephalopathy    Current Medications:    Current Facility-Administered Medications:     0.9 % sodium chloride infusion, , IntraVENous, PRN, Donnie Estrada DO    azithromycin (ZITHROMAX) 500 mg in dextrose 5 % 250 mL IVPB, 500 mg, IntraVENous, Q24H, PRISCILA Fregoso - CNP, Stopped at 01/05/22 0853    fluconazole (DIFLUCAN) in 0.9 % sodium chloride IVPB 100 mg, 100 mg, IntraVENous, Q24H, PRISCILA Fregoso - CNP, Last Rate: 50 mL/hr at 01/05/22 0911, 100 mg at 01/05/22 0911    vancomycin (VANCOCIN) 1,500 mg in dextrose 5 % 250 mL IVPB, 1,500 mg, IntraVENous, Once, PRISCILA Roth CNP, Last Rate: 166.7 mL/hr at 01/05/22 0836, 1,500 mg at 01/05/22 0836    allopurinol (ZYLOPRIM) tablet 300 mg, 300 mg, Oral, Daily, PRISCILA Fregoso CNP    0.9 % sodium chloride infusion, , IntraVENous, PRN, PRISCILA Fregoso CNP    dexmedetomidine (PRECEDEX) 400 mcg in sodium chloride 0.9 % 100 mL infusion, 0.2-1.4 mcg/kg/hr, IntraVENous, Continuous, Ja Zapata MD    meropenem (MERREM) 2,000 mg in sodium chloride 0.9 % 100 mL IVPB, 2,000 mg, IntraVENous, Q12H, PRISCILA Fregoso CNP    acyclovir (ZOVIRAX) 375 mg in dextrose 5 % 100 mL IVPB, 5 mg/kg (Ideal), IntraVENous, Q8H, PRISCILA Fregoso - CNP    0.9 % sodium chloride infusion, , IntraVENous, Continuous PRN, PRISCILA Alvarado - CNP    0.9 % sodium chloride infusion, 25 mL, IntraVENous, PRN, PRISCILA Alvarado CNP    alteplase (CATHFLO) injection 2 mg, 2 mg, IntraCATHeter, PRN, PRISCILA Alvarado CNP    magnesium hydroxide (MILK OF MAGNESIA) 400 MG/5ML suspension 10 mL, 10 mL, Oral, Daily PRN, PRISCILA Alvarado CNP    magnesium sulfate 4000 mg in 100 mL IVPB premix, 4,000 mg, IntraVENous, PRN, PRISCILA Alvarado CNP    potassium chloride 20 mEq/50 mL IVPB (Central Line), 20 mEq, IntraVENous, PRN, PRISCILA Alvarado - CNP    Saline Mouthwash 15 mL, 15 mL, Swish & Spit, 4x Daily AC & HS, PRISCILA Alvarado - CNP    Saline Mouthwash 15 mL, 15 mL, Swish & Spit, Q4H PRN, PRISCILA Alvarado CNP    sodium chloride flush 0.9 % injection 5-40 mL, 5-40 mL, IntraVENous, 2 times per day, PRISCILA Alvarado CNP, 10 mL at 01/04/22 2053    sodium chloride flush 0.9 % injection 5-40 mL, 5-40 mL, IntraVENous, PRN, PRISCILA Alvarado - CNP    diphenhydrAMINE (BENADRYL) tablet 25 mg, 25 mg, Oral, Nightly PRN, PRISCILA Alvarado CNP    acetaminophen (TYLENOL) tablet 650 mg, 650 mg, Oral, Q4H PRN, PRISCILA Alvarado - CNP    0.9 % sodium chloride infusion, 25 mL, IntraVENous, PRN, Megania Jacey, APRN - CNP    0.9 % sodium chloride infusion, , IntraVENous, PRN, Megania Jacey, APRN - CNP    dextrose 5 % solution, , IntraVENous, Continuous, Yani Gasca MD, Last Rate: 125 mL/hr at 01/05/22 0750, New Bag at 01/05/22 0750    lidocaine PF 1 % injection 5 mL, 5 mL, IntraDERmal, Once, Vladimir Mari APRN - CNP    sodium chloride flush 0.9 % injection 5-40 mL, 5-40 mL, IntraVENous, 2 times per day, Wilriannaia Jacey, APRN - CNP, 10 mL at 01/04/22 2054    sodium chloride flush 0.9 % injection 5-40 mL, 5-40 mL, IntraVENous, PRN, Vladimir Mari, APRN - CNP    sertraline (ZOLOFT) tablet 100 mg, 100 mg, Oral, Daily, Vladimir Mari APRN - CNP    levothyroxine (SYNTHROID) tablet 112 mcg, 112 mcg, Oral, Daily, Vladimir Mari, APRN - CNP    pantoprazole (PROTONIX) injection 40 mg, 40 mg, IntraVENous, Daily, Megania Jacey APRN - CNP, 40 mg at 01/04/22 1718    ondansetron (ZOFRAN) injection 4 mg, 4 mg, IntraVENous, Q6H PRN, Vladimir Mari, APRN - CNP    polyethylene glycol (GLYCOLAX) packet 17 g, 17 g, Oral, Daily PRN, Vladimir Mari APRN - CNP    QUEtiapine (SEROQUEL) tablet 25 mg, 25 mg, Oral, BID, Vladimir Mari APRN - CNP    haloperidol lactate (HALDOL) injection 5 mg, 5 mg, IntraVENous, Q4H PRN, Vladimir Mari APRN - CNP, 5 mg at 01/04/22 1847    0.9 % sodium chloride infusion, , IntraVENous, PRN, Preston Sue MD      Physical Exam  Constitutional  /60   Pulse 109   Temp 98.9 °F (37.2 °C) (Axillary)   Resp 26   Ht 5' 10.87\" (1.8 m)   Wt 203 lb 0.7 oz (92.1 kg)   SpO2 92%   BMI 28.42 kg/m²     General Alert, but not interactive, ill appearing  Cardiovascular: Rate and rhythm regular  Respiratory: Unlabored respiratory pattern     Neurologic  Mental status:  Patient does not respond to orientation questions or follow commands     Cranial nerves:   CN2: Does not blink to visual threat  CN 3,4,6: pupils equal and reactive to light, extraocular muscles intact with oculocephalic manuever  CN5: Exam limited d/t encephalopathy  CN7:face symmetric at rest  CN8: hearing symmetric to voice, turns head toward examiner speaking  CN9: exam limited d/t encephalopathy  CN11: exam limited d/t encephalopathy  CN12: exam limited d/t encephalopathy     Motor Exam:  Withdrawals to pain in BUEs, flexion to pain in BLEs. Does not follow commands     Sensory: Grimaces to painful stim in all extremities  Cerebellar/coordination: patient does not understand command  Tone: normal in all 4 extremities  Gait: held for patient safety        Images:  CT head wo contrast 12/22 @ OSH:  IMPRESSION:           No intracranial hemorrhage       Left subcutaneous scalp lesion measuring 3.0 x 1.2 cm. This is of unclear clinical    significance.  Malignancy cannot be entirely excluded given the patient's history of lymphoma      Pertinent Labs:   - 190 U/L 935 High   1,314 High      KAPPA, FREE LIGHT CHAINS, SERUM 3.30 - 19.40 mg/L 27.95 High     LAMBDA, FREE LIGHT CHAINS, SERUM 5.71 - 26.30 mg/L 20.35    K/L RATIO 0.26 - 1.65 1.37      Vit D, 25-Hydroxy >=30 ng/mL <5.0 Low       TSH 0.27 - 4.20 uIU/mL 15.70 High       Sed Rate 0 - 20 mm/Hr >120 High         UA:  Color, UA Straw/Yellow Yellow    Clarity, UA Clear Clear    Glucose, Ur Negative mg/dL Negative    Bilirubin Urine Negative SMALL Abnormal     Ketones, Urine Negative mg/dL Negative    Specific Gravity, UA 1.005 - 1.030 1.015    Blood, Urine Negative LARGE Abnormal     pH, UA 5.0 - 8.0 6.0    Protein, UA Negative mg/dL TRACE Abnormal     Urobilinogen, Urine <2.0 E.U./dL 2.0 Abnormal     Nitrite, Urine Negative Negative    Leukocyte Esterase, Urine Negative LARGE Abnormal     Microscopic Examination  YES    Urine Type  Voided    WBC, UA 0 - 5 /HPF 21-50 Abnormal     RBC, UA 0 - 4 /HPF 21-50 Abnormal     Bacteria, UA None Seen /HPF 3+ Abnormal  Assessment:  Jackie Castro is a 64 y.o.  with PHx sig for follicular NHL, spinal cord compression (associated with NHL 2007 & 2012), anxiety and hypothyroidism. He recently restarted treatment BR chemotherapy on 11/15/21 and was scheduled to receive his second infusion but presented to the ER at OSH prior to this with nose bleeds, dizziness, and fatigue plus a recent fall.  His mental status progressively declined while at the OSH and he was transferred to [address] [place] [address] for further management.      Plan:  - Agree with MRI, LP, metabolic work up, as you are  - Plan for sedation with precedex gtt per primary team, hopefully can complete further testing once this is initiated  - Routine EEG when able  - Abx expanded for neuro coverage per primary team  - Further plan based on results of above tests  - Call Neurology with any exam changes        PRISCILA Louis-CNP  Neurology & Neurocritical Care   Neurology Line: [phone]  [employer]: [facility] Neurology & Neuro Critical Care NPs  1/5/22

## 2022-01-05 NOTE — CARE COORDINATION
Dx: Pancytopenia- transferred from [facility] 16:     Peripheral at this time. Hx: Follicular NHL 2783, Relapse 2021, Spinal Cord Compression 2007, Hypothyroid, Anxiety, ELIO Metabolic Encephalopathy     Treatment Plan:  Current BR cycle 1 (11/15/21)      Cycle: 1    Update/Education: [facility]. Team is evaluating the patient at this time. Will continue to monitor for discharge plan which is uncertain at this time. Discharge Plan: uncertain at this time.        Pending:

## 2022-01-05 NOTE — PROGRESS NOTES
Unable to complete MRI at this time. PRN haldol given prior to MRI. Pt still agitated and moving too much. Dioni GONZALEZ and one time dose of IV ativan ordered. After administration pt was still moving too much to complete MRI so MD decided to cancel MRI for tonight.

## 2022-01-05 NOTE — PROGRESS NOTES
BMI 28.42 kg/m²     Weight:    Wt Readings from Last 3 Encounters:   22 203 lb 0.7 oz (92.1 kg)   17 210 lb 12.8 oz (95.6 kg)   17 215 lb (97.5 kg)       General: Disoriented & combative, unable to respond to questions. Pallorous with scattered petechiae & ecchymosis t/o  HEENT: normocephalic, PERRL, +bilateral conjunctival hemorrhages, Oral mucosa dry. Large left occipital mass  NECK: supple palpable adenopathy. Dirty, with crusting of unknown origin. SKIN: Diffuse bruising, petechiae t/o. Pitting edema to BLEs. Appears to have stool crusted to his backside. Stool crusted under his fingernails. CHEST: CTA bilaterally without use of accessory muscles  CV: Tachy, S1 S2, RRR, no MRG  ABD: NT ND normoactive BS, no palpable masses or hepatosplenomegaly  : bilateral groin LAD  NEURO: Disoriented, combative. Unable to perform neuro assessment  CATHETER: PIV only at this time    Data    CBC:   Recent Labs     22  0240   WBC 0.6* 0.4*   HGB 7.2* 5.9*   HCT 21.6* 17.5*   MCV 78.5* 78.3*   PLT 22* 65*     BMP/Mag:  Recent Labs     22  0240   * 153*   K 3.8 3.7   * 117*   CO2 23 24   PHOS 5.4* 5.8*  5.8*   BUN 65* 72*   CREATININE 2.4* 2.8*   MG 2.80*  --      LIVP:   Recent Labs     22  0240   AST 35 26   ALT 22 21   BILIDIR 2.5* 2.1*   BILITOT 3.1* 2.7*   ALKPHOS 146* 118     Coags:   Recent Labs     22   PROTIME 22.6*   INR 1.95*   APTT 31.3     Uric Acid   Recent Labs     225 22  0240   LABURIC 5.0 5.2       PROBLEM LIST:           DIAGNOSTIC IMAGIN. CT Head 21:  No intracranial hemorrhage     Left subcutaneous scalp lesion measuring 3.0 x 1.2 cm. This is of unclear clinical significance. Malignancy cannot be entirely excluded given the patient's history of lymphoma     2.  CT C/A/P 21: Compared to pre-treatment CT scans performed 10/20/21  CHEST:  No acute abnormality on chest CT Lymph node adjacent to the descending thoracic aorta which is increased in size 16 x 19 mm.  Prior 7 x 8 mm. Mild increased nodularity along the right major fissure upper lobe; 3 small new subcentimeter lung nodules the lung base.  Malignancy is a consideration     A/P:  Mixed response with progression of mid and upper abdominal conglomerate retroperitoneal adenopathy causing left-sided hydronephrosis (when compared to prior CT of 10/20/2021), and interval reduction in left pelvic sidewall adenopathy and a left inguinal   mass.     3. CT Head 12/28/21:  1. No acute intracranial abnormality. If indicated, brain MRI may be helpful for further evaluation. 2. Hyperdense material within the right maxillary sinus suggesting inspissated secretions. 3. Unchanged soft tissue mass of the left occipital calvarium.        4. Renal U/S 1/1/22:  RIGHT KIDNEY: [place] Comp limited evaluation. No definite hydronephrosis. LEFT KIDNEY: Probable hydronephrosis but evaluation is markedly limited. RIGHT RENAL LENGTH: 11.2 cm  (normal is 9-14cm)   LEFT RENAL LENGTH:  10.2 cm    (normal is 9-14cm)   BLADDER: Unremarkable   OTHER:  None     Note: Evaluation is markedly limited as the patient was combative and with limited mobility.     5. CT C/A/P 1/4/22:  CHEST:   1.  Large left and moderate right pleural effusion. Malignant effusion is not excluded. 2.  Widespread groundglass opacities in both lungs. Findings may represent edema, infection, or pulmonary lymphoma. 3.  Subcutaneous nodule posterior to the left scapula indeterminate for subcutaneous lymphoma. 4.  Left supraclavicular lymphadenopathy and mediastinal adenopathy consistent with lymphoma. ABDOMEN AND PELVIS:   1.  Conglomerate retroperitoneal lymphadenopathy, abdominal, pelvic, and left greater than right inguinal lymphadenopathy consistent with known lymphoma. 2.  Innumerable peritoneal nodules concerning for peritoneal carcinomatosis.    3.  Stable partially calcified central mesenteric mass. 4.  Air-fluid levels in the bowel may represent diarrhea. No definitive evidence of obstruction on the current study. 5.  AVN of the bilateral femoral heads with subchondral collapse of the right femoral head     6. MRI Brain 1/4/22:  Pending     7. Echocardiogram 1/4/22:  Pending     8. BLE Dopplers 1/4/22:  Pending     PATHOLOGY:  1. BM Bx/Asp 1/5/22:  Pending     PROBLEM LIST:             1. Follicular NHL originally diagnosed 9/2007, most recent relapse 10/2021  2. Spinal Cord Compression associated with original diagnosis in 2007  3. Hypothyroid  4. Anxiety  5. ELIO w/hypercalcemia  6. Metabolic Encephalopathy      TREATMENT:            1. Rad Tx to T-spine & L-spine 2007  RadTx 3000cGy / 10fractions T1-T4 9/2007  Progression? 7/2012  2. RadTx T2-L3 6/15/12, L1-L4 6/28/12  3. BR x6 cycles 7/2012-1/2013  Progression 10/2018  4. BR x6 cycles 10/2018-3/2019  Progression 10/2021  5. BR   - Cycle 1 11/15/21      ASSESSMENT AND PLAN:           1. Follicular Lymphoma: Relapsed 10/2021. Now with high concern for possible progression  - No biopsy done following relapse 10/20/21 (pt declined to have biopsy performed)  - CT scans 12/22/21: Mixed response following 1 cycle of BR (scans compared to pre-tx scans 10/20/21  - S/p BR 11/15/21. Further tx on hold until acute issues resolved or elucidated / confirmed to be r/t progression  - BM Bx 1/5/22 - pending  Left inguinal node biopsy today      2. ID: Afebrile, pneumonia likely gram negative, possible UTI  - Pan Cxs 1/4/22 - pending  -High suspicion of UTI  - Procalcitonin 5.25 , , Sed rate >120, lactic acid 3.1 (1/4) - all highly elevated  - Start valtrex & fluconazole ppx while neutropenic  -CT C/A/P 1/4/22:   Large left and moderate right pleural effusion. Malignant effusion is not excluded.  Widespread groundglass opacities in both lungs.  Findings may represent edema, infection, or pulmonary lymphoma.   - Start due to patient unable to remain still  - Cont restraints for now  - Haldol PRN     6. GI: Elevated bilirubin - lymphoma infiltrate vs other?  -CT C/A/P 1/5/22:   Subcutaneous nodule posterior to the left scapula indeterminate for subcutaneous lymphoma. Left supraclavicular lymphadenopathy and mediastinal adenopathy consistent with lymphoma. Conglomerate retroperitoneal lymphadenopathy, abdominal, pelvic, and left greater than right inguinal lymphadenopathy consistent with known lymphoma. Innumerable peritoneal nodules concerning for peritoneal carcinomatosis. Elevated LFTs:  - Bili 3.1, normal transaminases  - Hepatitis panel 1/1 - Neg  Hyperammonemia:  - Hold lactulose for now  - Follow ammonia daily  Nutrition:  NPO d/t altered menation  - Consult SLP when appropriate  - Insert corpak to assist with meds & EN- unable to insert 1/4/21 due to epistaxis  - Consult dietitian for EN mgmt     7. : progressive RP LAD w/left-sided hydro  - Repeating CT scans 1/4/22- no hydronephrosis present     8. Endo: H/o hypothyroid:  - Cont synthroid 112mcg daily  - Check TSH 15.7 /T4 0.4 on admit here 1/4/22 - change synthroid to IV, unsure if taking previously d/t mental status changes     sed     9. Acute Debilitation: 2/2 acute medical issues  - Consult SLP/PT/OT once improved. Not appropriate at this time     10. Code Status: Limited code, continue current level of care  - Per sister Sharlene Mora, [phone]), pt had previously stated he would not want to be resuscitated should he experience cardiopulmonary arrest  - Changed code status to limited code, no to all but vasopressors. Continue aggressive care for now  - Consulted Palliative care         - DVT Prophylaxis: Platelets <20,304 cells/dL - prophylactic lovenox on hold and mechanical prophylaxis with bilateral SCDs while in bed in place.   Contraindications to pharmacologic prophylaxis: Thrombocytopenia  Contraindications to mechanical prophylaxis: None    - Disposition: Unknown at this time, very guarded at this time    The patient was seen and examined by Dr. Jovanna Gutierres, PRISCILA - CNP

## 2022-01-05 NOTE — PROGRESS NOTES
4 Eyes Admission Assessment     I agree as the admission nurse that 2 RN's have performed a thorough Head to Toe Skin Assessment on the patient. ALL assessment sites listed below have been assessed on admission. Areas assessed by both nurses: Rosita Core  [x]   Head, Face, and Ears   [x]   Shoulders, Back, and Chest  [x]   Arms, Elbows, and Hands   [x]   Coccyx, Sacrum, and Ischium  [x]   Legs, Feet, and Heels        Does the Patient have Skin Breakdown?   Yes a wound was noted on the Admission Assessment and an LDA was Initiated documentation include the Rosa Isela-wound, Wound Assessment, Measurements, Dressing Treatment, Drainage, and Color\",         Bharath Prevention initiated:  Yes   Wound Care Orders initiated:  No      WOC nurse consulted for Pressure Injury (Stage 3,4, Unstageable, DTI, NWPT, and Complex wounds) or Bharath score 18 or lower:  NA      Nurse 1 eSignature: Electronically signed by Edilberto Espinoza RN on 1/5/22 at 5:40 AM EST    **SHARE this note so that the co-signing nurse is able to place an eSignature**    Nurse 2 eSignature: {Esignature:[id number]}

## 2022-01-05 NOTE — PROGRESS NOTES
[facility]    Patient remains gravely ill. Appreciate everyone's input into how best to proceed with this case. Although all aspects suggest progressive disease we still do not have a unifying diagnosis to explain all of the physical, radiographic, and laboratory findings. John Potter. Vimal Crowell M.D., MPH  Co-Chair, Department of  Clinical [facility], [address] [place] [address] [phone]  [facility] [phone] [id number]. [email or web address]    \"Participating in a clinical trial is the first step to fighting cancer; not the last.\"

## 2022-01-05 NOTE — PROGRESS NOTES
Nephrology Consult Note          [facility]      [email or web address]         Phone: [phone]      1/5/2022 10:31 AM     Patient: Libby Ruiz [id number]  [id number]  Date of Admit: 1/4/2022 LOS: 1 days  Referring physician: Dr Ai Walker History and Plan:  Patient seen at bedside. Remain confused. 450 ml of urine output was recorded on yesterday's date. Cr keep getting worse despite IV D 5 %. BP soft but stable. ELIO is most likely due to ATN hemodynamics + severe anemia + IV volume depletion + NSAIDs use  + possible sepsis. Getting a kidney biopsy would be high risk due to severe thrombocytopenia. Patient is unfortunately progressing towards dialysis. Continue IV D 5 %. Follow PTHr P, vit D, Multiple Myeloma workup  Rule out TTP  Avoid Gadolinium contrast. If need to use it, then please call us prior to that. Avoid nephrotoxins  Monitor input, output and daily weight  Renally dose all medications        Thank you for allowing us to participate in this patient's care    In case of any question please call us at our 24 hour answering service [phone] or from 7 AM to 5 PM via [employer] or cell number    Gloria Garcia MD  [facility]  [facility] [address]  [place], [address]  Fax: [phone]  Office: [phone]       Assessment & Plan     Renal function:  Acute kidney Injury:     Baseline Cr: In April 2021 was 0.91  Cr on 12/22 at [facility] was 1.7 and it is progressively getting worse and last one on 1/4 was 2.44    UA: 12/23 Protein trace, No Blood or [facility]  Renal imaging: CT 12/22/21  Similar left-sided hydronephrosis related to obstructing retroperitoneal adenopathy     Repeat CT: No hydronephrosis. Extensive lymphoma      Nephrotoxic exposures including NSAIDs use or contrast exposure: Patient was taking NSAIDs. Recent Antibiotics use: Patient is on antibiotics. Hemodynamic insults: BP soft.    Recent Vascular procedure: No  Known autoimmune disease: No  Echo: No recent test  Known Cirrhosis: No  Current infection or sepsis: No    ELIO is most likely due to ATN hemodynamics + severe anemia + IV volume depletion + NSAIDs use   Repeat CT did not show hydronephrosis  Rule out TTP        Electrolytes:  1) Hypernatremia    2) Hypercalcemia. PTH suppressed. Ca is now normal.     PTHr P, vit D, Multiple Myeloma workup up      Lab Results   Component Value Date    CREATININE 2.6 (H) 01/05/2022    BUN 72 (H) 01/05/2022     (H) 01/05/2022    K 3.5 01/05/2022     (H) 01/05/2022    CO2 25 01/05/2022      Lab Results   Component Value Date    PTH 9.4 (L) 01/04/2022    CALCIUM 9.1 01/05/2022    CAION 1.32 01/05/2022    PHOS 5.2 (H) 01/05/2022         Acid/Base status:  Stable. Volume status/BP:  BP stable. Hematology:   Pancytopenia. Lab Results   Component Value Date    WBC 0.4 (LL) 01/05/2022    HGB 5.9 (LL) 01/05/2022    HCT 17.5 (LL) 01/05/2022    MCV 78.3 (L) 01/05/2022    PLT 65 (L) 01/05/2022           Reason for Consult     Reason for consult: ELIO        History of Present Illness   Andrea Villafana is a 64 y.o. 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 He has undergone several rounds of radiation and received BR chemotherapy in 2012, 2018, and most recently restarted tx 11/2021. His treatment course has been mostly unremarkable. His PMH is significant for spinal cord compression (associated with NHL 2007 & 2012), anxiety, & hypothyroid. Patient was admitted at [facility] ED on 12/22/21 with c/o refractory epistaxis after falling a couple of days prior. There was report of  feeling fatigued, dizzy, and was pallorous with generalized petechiae at that time. He reported hitting his head, neck, back, chest & abdomen and also had laceration to his chin on presentation. He underwent CT scans d/t trauma, however there were no apparent fractures or other traumatic injuries.  Regarding lymphoma, CT scans were reported to be c/w mixed response to 1 cycle of BR, when compared to pre-tx scans done in October. Labwork was significant for profound thrombocytopenia & anemia and ELIO with Cr of 1.7, hypercalcemia 10.5 and lactic acidosis. During stay at [place] [address] patient became encephalopathic. Now patient was transferred here for further management of his Lymphoma and team will plan for bone marrow biopsy. There was also report of NSAIDs use at home prior to admission. Review of Systems     Unable. Past Medical History     Past Medical History:   Diagnosis Date    Cancer [facility])     Lymphoma    Hyperlipidemia     Neurological disorder          Past Surgical History     No past surgical history on file. Family History     Family History   Problem Relation Age of Onset    Lung Cancer Mother     Alcohol Abuse Father          Social History     Social History     Tobacco Use    Smoking status: Current Every Day Smoker    Smokeless tobacco: Never Used   Substance Use Topics    Alcohol use: Not on file          Past medical, family, and social histories were reviewed as previously documented. Updates were made as necessary.       Inpatient Medications and Allergies       Scheduled Meds:   azithromycin  500 mg IntraVENous Q24H    fluconazole  100 mg IntraVENous Q24H    allopurinol  300 mg Oral Daily    meropenem (MERREM) IVPB  2,000 mg IntraVENous Q12H    acyclovir  5 mg/kg (Ideal) IntraVENous Q8H    Saline Mouthwash  15 mL Swish & Spit 4x Daily AC & HS    sodium chloride flush  5-40 mL IntraVENous 2 times per day    lidocaine 1 % injection  5 mL IntraDERmal Once    sodium chloride flush  5-40 mL IntraVENous 2 times per day    sertraline  100 mg Oral Daily    levothyroxine  112 mcg Oral Daily    pantoprazole  40 mg IntraVENous Daily    QUEtiapine  25 mg Oral BID         Allergies: No Known Allergies      Vital Signs and Input Output     Vitals:    01/05/22 1000   BP: 101/63   Pulse: 112 Resp: 22   Temp:    SpO2: 92%           Intake/Output Summary (Last 24 hours) at 1/5/2022 1031  Last data filed at 1/5/2022 1010  Gross per 24 hour   Intake 2800 ml   Output 700 ml   Net 2100 ml           Physical Exam     General appearance: NAD  Head: Normocephalic, without obvious abnormality, atraumatic   Mouth: Dry mucous membrane  Neck: Supple.    Lungs: Clear to auscultation bilaterally and no respiratory distress on 2 liters oxygen  Heart: S1, S2.   Abdomen: soft,  non-distended  Extremities: + edema  Psych: Calm  Neuro: Confused and opens eyes to commands briefly       Laboratory Data   See above    Diagnostic Studies   Pertinent images reviewed

## 2022-01-05 NOTE — PROGRESS NOTES
Palliative Care Chart Review  and Check in Note:     NAME:  Juan Carlos Morillo Date: 1/4/2022  Hospital Day:  Hospital Day: 2   Current Code status: Limited    Palliative care is continuing to following Mr. Everett for symptom management,  and goals of care discussion as needed. Patient's chart reviewed today 1/5/22. Called [facility] [place] [id number] records office to obtain a copy of pt's HCPOA. They report they will look for it and call back. Double-checked pt's chart, does not appear that a HCPOA was sent with the pt. In 's note from 1/4 at [address], it appears that documentation may not have been completed. Called pt's sister Jo-Ann Elaine, explained that so far have not received a copy of HCPOA and it appears it may not have been completed. She provided the number for pt's daughter Elmo Ruvalcaba, she does not have contact info for the pt's other children. She reports that she believes Elmo List would defer decision making to her. Explained that this NP would need to attempt to reach pt's children to see if they would defer decision making to Jo-Ann Elaine. [facility] [phone], no answer, left voicemail with callback number. The following are the currently established goals/code status, and Symptom management. Goals of care: Continue with current management. Pt's sister is hopeful for recovery, however understands that his condition is quite serious.       Code status: Limited - DNR/DNI    Discharge plan: TBD pending hospital course      PRISCILA Hartman CNP  01/05/22  1:06 PM

## 2022-01-05 NOTE — PROGRESS NOTES
4 Eyes Admission Assessment     I agree as the admission nurse that 2 RN's have performed a thorough Head to Toe Skin Assessment on the patient. ALL assessment sites listed below have been assessed on admission. Areas assessed by both nurses: Nubia Puga RN and [facility]  [x]   Head, Face, and Ears   [x]   Shoulders, Back, and Chest  [x]   Arms, Elbows, and Hands   [x]   Coccyx, Sacrum, and Ischium  [x]   Legs, Feet, and Heels        Does the Patient have Skin Breakdown? Pt has generalized petechiae and bruising noted. Scabs on back of bilateral calves. Urethra bleeding. Redness that is not blanchable on coccyx. Laceration/ scab on L chin/neck area. Lymphedema to left side of groin area and a mass noted on left neck.          Bharath Prevention initiated:  Yes   Wound Care Orders initiated:  No      C nurse consulted for Pressure Injury (Stage 3,4, Unstageable, DTI, NWPT, and Complex wounds) or Bharath score 18 or lower:  No      Nurse 1 eSignature: Electronically signed by Anne Faye RN on 1/4/22 at 8:35 PM EST    **SHARE this note so that the co-signing nurse is able to place an eSignature**    Nurse 2 eSignature: Electronically signed by Jesenia Rico RN on 1/5/22 at 1:45 AM EST

## 2022-01-05 NOTE — PROCEDURES
Toby Wyatt is a 64 y.o. male patient. No diagnosis found. Past Medical History:   Diagnosis Date    Cancer [facility])     Lymphoma    Hyperlipidemia     Neurological disorder      Blood pressure 102/64, pulse 116, temperature 99.3 °F (37.4 °C), temperature source Axillary, resp. rate 24, height 5' 10.87\" (1.8 m), weight 203 lb 0.7 oz (92.1 kg), SpO2 92 %. Central Line    Date/Time: 1/4/2022 10:54 PM  Performed by: Rocio Dinh DO  Authorized by: Twin Baez MD   Consent: Written consent obtained. Risks and benefits: risks, benefits and alternatives were discussed  Consent given by: guardian  Procedure consent: procedure consent matches procedure scheduled  Relevant documents: relevant documents present and verified  Test results: test results available and properly labeled  Site marked: the operative site was marked  Imaging studies: imaging studies available  Required items: required blood products, implants, devices, and special equipment available  Patient identity confirmed: arm band and hospital-assigned identification number  Time out: Immediately prior to procedure a \"time out\" was called to verify the correct patient, procedure, equipment, support staff and site/side marked as required.   Indications: vascular access    Anesthesia:  Local Anesthetic: lidocaine 1% without epinephrine    Sedation:  Patient sedated: no    Preparation: skin prepped with 2% chlorhexidine  Skin prep agent dried: skin prep agent completely dried prior to procedure  Sterile barriers: all five maximum sterile barriers used - cap, mask, sterile gown, sterile gloves, and large sterile sheet  Hand hygiene: hand hygiene performed prior to central venous catheter insertion  Location details: right internal jugular  Patient position: Trendelenburg  Catheter type: triple lumen  Catheter size: 7 Fr  Pre-procedure: landmarks identified  Ultrasound guidance: yes  Sterile ultrasound techniques: sterile gel and sterile probe covers were used  Number of attempts: 1  Successful placement: yes  Post-procedure: line sutured and dressing applied  Assessment: blood return through all ports,  free fluid flow,  placement verified by x-ray and no pneumothorax on x-ray  Patient tolerance: patient tolerated the procedure well with no immediate complications  Comments: CXR confirms placement of CVC tip at cavoatrial junction, no pneumothorax  EBL<5mL                Camryn Rojas DO  1/4/2022

## 2022-01-05 NOTE — PLAN OF CARE
Problem: Non-Violent Restraints  Goal: No harm/injury to patient while restraints in use  Outcome: Ongoing  Note: Patient has been checked Q1 hour for injury with restraints. No injury noted. Will continue to monitor. Problem: Falls - Risk of:  Goal: Will remain free from falls  Description: Will remain free from falls  Outcome: Ongoing  Note: Pt is a High fall risk. See Napolean Felty Fall Score and ABCDS Injury Risk assessments.   + Screening for Orthostasis and/or + High Fall Risk per SINGH/ABCDS: Explained fall risk precautions to pt and family and rationale behind their use to keep the patient safe. Pt bed is in low position, side rails up, call light and belongings are in reach. Fall wristband applied and present on pts wrist.  Bed alarm on. Pt encouraged to call for assistance. Will continue with hourly rounds for PO intake, pain needs, toileting and repositioning as needed. Problem: Skin Integrity:  Goal: Absence of new skin breakdown  Description: Absence of new skin breakdown  Outcome: Ongoing  Note: Patient has a very low nerissa scale. Q2 turns, sacral heart on. No new signs of skin breakdown. Will continue to monitor.

## 2022-01-05 NOTE — CONSULTS
Initial Pulmonary & Critical Care Consult Note      Reason for Consult: Bilateral pleural effusions, bilateral  Requesting Physician: Dr. Roberts Kin:   [address] [facility] / hospitals:                The patient is a 64 y.o. 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 He has undergone several rounds of radiation and received BR chemotherapy in 2012, 2018, and most recently restarted tx 11/2021 after he was suspected to have another relapse with diffuse, progressive lymphadenopathy and a new scalp lesion. He was encourage to have biopsy to confirm FL, however he politely declined to have this done. His PMH is significant for spinal cord compression (associated with NHL 2007 & 2012), anxiety, & hypothyroid. He was then started on treatment with BR again 11/15/21. He was most recently seen at [facility] 12/14/21 with stable blood counts, and no significant cytopenias. He apparently tolerated cycle 1 well and felt his lymphadenopathy was improving. Cycle 2 was then delayed d/t insurance issues (?) per notes and he was scheduled to return to clinic the end of December. However prior to this appt, he presented to outside ED 12/22/21 with c/o refractory epistaxis after falling a couple of days prior. He also reported feeling fatigued, dizzy, and was pallorous with generalized petechiae at that time. He reported hitting his head, neck, back, chest & abdomen and also had laceration to his chin on presentation. He underwent CT scans d/t trauma, however there were no apparent fractures or other traumatic injuries    During admission he has had progressive, acute deterioration and is now encephalopathic for unclear reasons and continues to be profoundly pancytopenic. There is concern he possibly has progressive lymphoma involving the CNS +/- bone marrow +/- liver which could be contributing to his current acute issues.  However he will need full w/u at this time to determine if this is malignant, infectious, or some other acute heme process including TTP, DIC, or less likely even HLH. Apparently plans were made for bone marrow biopsy to evaluate cytopenias (ordered in radiology 12/23?), however this has not yet been completed for reasons which are unclear. Above history obtained from chart as patient confused and while he will follow some commands he would not verbalize to any questions but did shake his head. He looks to have moderate increase in work of breathing with respiratory rate in the 20s. He is able to protect his airway at present    Past Medical History:      Diagnosis Date    Cancer [facility])     Lymphoma    Hyperlipidemia     Neurological disorder       Past Surgical History:    No past surgical history on file.   Current Medications:     azithromycin  500 mg IntraVENous Q24H    fluconazole  100 mg IntraVENous Q24H    acyclovir  5 mg/kg (Ideal) IntraVENous Q12H    vancomycin  1,500 mg IntraVENous Once    allopurinol  300 mg Oral Daily    Saline Mouthwash  15 mL Swish & Spit 4x Daily AC & HS    sodium chloride flush  5-40 mL IntraVENous 2 times per day    lidocaine 1 % injection  5 mL IntraDERmal Once    sodium chloride flush  5-40 mL IntraVENous 2 times per day    sertraline  100 mg Oral Daily    levothyroxine  112 mcg Oral Daily    pantoprazole  40 mg IntraVENous Daily    meropenem (MERREM) IVPB  1,000 mg IntraVENous Q12H    QUEtiapine  25 mg Oral BID        Social History:    Current smoker    Family History:   Lung cancer  Alcohol abuse    REVIEW OF SYSTEMS:    Unobtainable - Patient intubated      Objective:   PHYSICAL EXAM:      VITALS:  /60   Pulse 109   Temp 98.9 °F (37.2 °C) (Axillary)   Resp 26   Ht 5' 10.87\" (1.8 m)   Wt 203 lb 0.7 oz (92.1 kg)   SpO2 92%   BMI 28.42 kg/m²   24HR INTAKE/OUTPUT:      Intake/Output Summary (Last 24 hours) at 1/5/2022 0808  Last data filed at 1/5/2022 0616  Gross per 24 hour   Intake 01/05/2022    GLUCOSE 134 01/05/2022    GLUCOSE 113 07/24/2017     Hepatic Function Panel:    Lab Results   Component Value Date    ALKPHOS 118 01/05/2022    ALT 21 01/05/2022    AST 26 01/05/2022    PROT 5.4 01/05/2022    PROT 7.3 07/24/2017    BILITOT 2.7 01/05/2022    BILIDIR 2.1 01/05/2022    IBILI 0.6 01/05/2022     TSH 15.70 with Free T4 0.4  LA 2.2  Pro-BNP 3543  Procalcitonin 5.25      NH3 48  INR 1.95  Fibrinogen 766  Ferritin 12,924  Haptoglobin 264  ESR > 120  SARs-COV2 Not detected    ABG:  None    Cultures:   Blood Culture: In lab  Urine Culture: In lab        Radiology Review:  All pertinent images / reports were reviewed as a part of this visit. CT Chest/Abdomen reveals the following:  FINDINGS:       : No additional findings.       Medical devices: None.       CHEST:   Airways: Central airways are patent.     Lungs and pleura: There is a large left and small right pleural effusion. With overlying consolidation in the lung bases. There is widespread bilateral groundglass opacities throughout both lungs. Mild upper lobe emphysema.           Heart: Heart size is normal.    Great vessels: Aorta and pulmonary artery are normal in caliber. Other mediastinal structures and lower neck: Normal.    Adenopathy: Left supraclavicular lymphadenopathy, largest 1.5 x 1.1 cm (series 601, image 11). Suspected enlarged subcarinal node difficult to evaluate without contrast measuring 2.1 x 1.8 cm (series 601, image 49).     Chest wall and axilla: 1.4 x 1.1 cm subcutaneous nodule posterior to the left scapular body (series 601, image 36). Osseous structures: No significant abnormality. Multilevel degenerative changes in the spine. There is a bridging osteophyte at the left eighth costovertebral junction.       ABDOMEN AND PELVIS:   Liver: Normal.   Biliary system: Hyperdense material gallbladder may represent sludge. No biliary ductal dilation.    Pancreas: Normal.   Spleen: Spleen measures upper limits of normal. 12.5 cm    Adrenal glands: Normal.    Kidneys, ureters, bladder: No hydronephrosis or calculi. There is perinephric fat stranding surrounding the right kidney the bladder is decompressed by a Fernandes catheter with diffuse bladder wall thickening. Genital organs: Prostate is normal in contour.       Bowel: The small and large bowel are normal in caliber. No bowel wall thickening. The appendix is not well visualized but there are no secondary signs of appendicitis there are air-fluid levels within the bowel at the level of a mesenteric mass without    definitive evidence of obstruction. Abdominal wall: Diffuse anasarca    Peritoneum/retroperitoneum: Trace ascites in the abdomen and pelvis. Innumerable peritoneal nodules are present.       Vasculature: No aneurysm. Lymph nodes: Diffuse lymphadenopathy in the abdomen, pelvis and retroperitoneum as well as the bilateral left greater than right inguinal regions with representative examples as follows: *   Conglomerate retroperitoneal lymphadenopathy appears to be greatest at the level of the infrarenal aorta measuring approximately 5.7 x 4.4 cm. (Series 601, image 131). *  Posterior to the left psoas muscle, 3.5 x 3.4 cm (series 601, image 147)   *  Left pelvic sidewall adjacent to the bladder 6.7 x 4.3 cm (series 601, image 188)   *  Left inguinal canal 7.5 x 4.7 cm (series 601, image 204).     Osseous structures: ORIF of the left proximal femur. AVN of the left humeral head. Suspected AVN with subchondral collapse of the right femoral head. Ill-defined sclerotic lesion in the right iliac wing. Degenerative changes in the lumbar spine. Bilateral L5 pars defects with grade 1 anterolisthesis of L5 on S1.           Impression       CHEST:   1.  Large left and moderate right pleural effusion. Malignant effusion is not excluded. 2.  Widespread groundglass opacities in both lungs.  Findings may represent edema, infection, or pulmonary lymphoma. 3.  Subcutaneous nodule posterior to the left scapula indeterminate for subcutaneous lymphoma. 4.  Left supraclavicular lymphadenopathy and mediastinal adenopathy consistent with lymphoma.       ABDOMEN AND PELVIS:   1.  Conglomerate retroperitoneal lymphadenopathy, abdominal, pelvic, and left greater than right inguinal lymphadenopathy consistent with known lymphoma. 2.  Innumerable peritoneal nodules concerning for peritoneal carcinomatosis. 3.  Stable partially calcified central mesenteric mass. 4.  Air-fluid levels in the bowel may represent diarrhea. No definitive evidence of obstruction on the current study. 5.  AVN of the bilateral femoral heads with subchondral collapse of the right femoral head. CT Chest 12/22/2021 - [facility]        IMPRESSION:           No acute abnormality on chest CT       Lymph node adjacent to the descending thoracic aorta which is increased in size 16 x 19 mm.     Prior 7 x 8 mm.       Mild increased nodularity along the right major fissure upper lobe; 3 small new subcentimeter    lung nodules the lung base.  Malignancy is a consideration         PFTs:  None on file    Echo: 1/4/2022   Findings      Left Ventricle   Left ventricular cavity size is normal. Normal left ventricular wall   thickness. Overall left ventricular systolic function appears within normal   limits with an estimated ejection fraction of 55-60%. No diagnostic regional   wall motion abnormalities noted. Diastolic filling parameters suggests   normal diastolic function. Mitral Valve   Mitral annular calcification is present. Mitral valve leaflets appear   structurally normal. Trace mitral regurgitation is present. No evidence of   mitral valve stenosis. Left Atrium   The left atrium appears normal in size. Aortic Valve   Aortic valve leaflets appear slightly thickened. The aortic valve appears   tricuspid. Trace aortic regurgitation is present.  No evidence of aortic   valve stenosis. Aorta   The aortic root is normal in size. Right Ventricle   The right ventricle is not well visualized. TAPSE measures: 2.08 cm. RV S   velocity measures: 19.4 cm/s. Tricuspid Valve   The tricuspid valve was not well visualized. Cannot rule out tricuspid   regurgitation. No evidence of tricuspid stenosis. Right Atrium   Right atrium is not well visualized. Pulmonic Valve   The pulmonic valve is not well visualized. No evidence of pulmonic valve   regurgitation. No evidence of pulmonic valve stenosis. Pericardial Effusion   No pericardial effusion noted. Pleural Effusion   No pleural effusion. Miscellaneous   IVC size is normal (<2.1cm) and collapses > 50% with respiration consistent   with normal RA pressure (3mmHg). Doppler Studies: 1/4  Right   Technically difficult study due to constant patient movement (kicking and   moving his legs) - combative. Within the limitations of this study there appears to be no evidence of deep   or superficial venous thrombosis of the right lower extremity in the veins   visualized. Left   Extremely difficult study on the left leg. There is a large, hypervascular mass noted in the left groin area that is too   large to measure with ultrasound. Abnormal, continuous venous Doppler signals are noted in the common femoral,   femoral and popliteal vein (possibly due to extrinsic compression by the mass. Only the common femoral, proximal femoral, proximal deep femoral and popliteal   veins could be images (color Doppler imaging was used in lieu of compression   maneuvers). There does not appear to be any evidence of deep venous thrombosis   noted in the above listed veins in the left lower extremity. Study was discontinued on the left calf due to leg movement. Assessment/Plan       1. Bilateral pleural effusions  2. ELIO with hyperphosphatemia  3. Hypernatremia  4. Hypothryoid  5. Bilateral groundglass opacities   6. Acute hypoxemic respiratory failure  7. Acute encephalopathy   8 Agitation precluding needed imaging studies    Plan    1. Precedex gtt to allow for CT head and bone marrow biopsy. Wean off after all necessary procedures performed  2. ABG now to assess for hypercapnia as cause for encephalopathy  3. Obtain previous CT chest images from [facility] December 22 4. Check cortisol level. 5.  Continue empiric azithromycin, Diflucan, meropenem, and vancomycin  6. Await galactomannan, urine legionella/strep pneumo antigen and beta D glucan  7. BM biopsy, routine EEG, Head CT and  LP  8. If head CT  unremarkable then would consider an MRI but that is going to be a much harder endeavor in controlling his agitation with Precedex given the duration of the study  and special pump required   9. Neurology, nephrology, palliative care following  10. Will hold off on thoracentesis until other studies obtained -I suspect that the pleural effusion is either on the basis of malignancy or volume overload. Empyema is unlikely although not impossible  11. DNR/DNI    Per Palliative Care:    Angelito Valerio reports that she believes pt completed a HCPOA at [facility] and named her as the primary agent. Will call [facility] to try to obtain a copy. Pt is not , has three adult children who live out of state who would be pt's legal NOK. Their names are Raheem Early and Brigitte Abreu. Explained to Angelito Valerio that if we are unable to locate [facility], would have to go to pt's children for medical decision making, however the children have the option of deferring to her.      Danny Ames

## 2022-01-06 NOTE — PLAN OF CARE
Problem: Non-Violent Restraints  Goal: Removal from restraints as soon as assessed to be safe  1/6/2022 0545 by Roque Herron RN  Outcome: Ongoing  Note: Pt remains in bilateral soft wrist restraints due to impulsivity, poor safety awareness, and pulling at lines/tubes. Visual check every hour, and restraint need re-evaluated every two hours per hospital policy. See restraint flowsheet. Goal is for patient to remain free of harm/injury. Problem: Skin Integrity:  Goal: Absence of new skin breakdown  Description: Absence of new skin breakdown  1/6/2022 0545 by Roque Herron RN  Outcome: Ongoing  Note: Skin breakdown prevention precautions in place. Turning patient every 2 hours as tolerated, ROM activities, preventative dressing in place on bony prominences/ sacrum, and podus boots utilized. Wounds noted in flowsheets. Problem: Bleeding:  Goal: Will show no signs and symptoms of excessive bleeding  Description: Will show no signs and symptoms of excessive bleeding  Outcome: Ongoing  Note: Patient's hemoglobin this AM:   Recent Labs     01/06/22  0302   HGB 8.4*     Patient's platelet count this AM:   Recent Labs     01/06/22  0302   PLT 48*    Thrombocytopenia Precautions in place. Patient showing no signs or symptoms of active bleeding. Transfusion not indicated at this time. Patient verbalizes understanding of all instructions. Will continue to assess and implement POC. Call light within reach and hourly rounding in place.

## 2022-01-06 NOTE — PROGRESS NOTES
otherwise remainder of 10-point ROS negative    Physical Exam:     I&O:      Intake/Output Summary (Last 24 hours) at 1/6/2022 0616  Last data filed at 1/6/2022 7634  Gross per 24 hour   Intake 5923 ml   Output 780 ml   Net 5143 ml       Vital Signs:  BP (!) 90/52   Pulse 100   Temp 99 °F (37.2 °C) (Axillary)   Resp 19   Ht 5' 10.87\" (1.8 m)   Wt 203 lb 0.7 oz (92.1 kg)   SpO2 94%   BMI 28.42 kg/m²     Weight:    Wt Readings from Last 3 Encounters:   01/04/22 203 lb 0.7 oz (92.1 kg)   07/24/17 210 lb 12.8 oz (95.6 kg)   04/03/17 215 lb (97.5 kg)       General: Disoriented & combative, unable to respond to questions. Pallorous with scattered petechiae & ecchymosis t/o  HEENT: normocephalic, PERRL, + bilateral conjunctival hemorrhages, Oral mucosa dry. Large left occipital mass  NECK: supple palpable adenopathy. SKIN: Diffuse bruising, petechiae t/o. Pitting edema to BLEs  CHEST: CTA bilaterally without use of accessory muscles  CV: Tachy, S1 S2, RRR, no MRG  ABD: NT ND normoactive BS, no palpable masses or hepatosplenomegaly  : bilateral groin LAD  NEURO: Disoriented, combative. Unable to perform neuro assessment  CATHETER: Right IJ CVC (1/4/22) - CDI    Data    CBC:   Recent Labs     01/05/22  0240 01/05/22  1432 01/06/22  0302   WBC 0.4* 0.5* 0.6*   HGB 5.9* 6.5* 8.4*   HCT 17.5* 19.4* 25.3*   MCV 78.3* 80.6 85.0   PLT 65* 48* 48*     BMP/Mag:  Recent Labs     01/04/22  1655 01/05/22  0240 01/05/22  1330 01/05/22  2027 01/06/22  0302   *   < > 149* 150* 144   K 3.8   < > 3.6 4.3 5.5*   *   < > 112* 114* 109   CO2 23   < > 22 25 25   PHOS 5.4*   < > 5.1* 7.7* 10.8*   BUN 65*   < > 71* 74* 78*   CREATININE 2.4*   < > 2.9* 2.8* 3.3*   MG 2.80*  --   --   --   --     < > = values in this interval not displayed.      LIVP:   Recent Labs     01/04/22  1655 01/05/22  0240 01/06/22  0302   AST 35 26 29   ALT 22 21 18   BILIDIR 2.5* 2.1* 1.9*   BILITOT 3.1* 2.7* 2.4*   ALKPHOS 146* 118 116     Coags: Recent Labs     22  1655 22  1440 22  0302   PROTIME 22.6* 20.9* 19.2*   INR 1.95* 1.80* 1.66*   APTT 31.3  --  32.6     Uric Acid   Recent Labs     22  1655 22  0240   LABURIC 5.0 5.2     DIAGNOSTIC IMAGIN. CT Head ([facility] 21): No intracranial hemorrhage     Left subcutaneous scalp lesion measuring 3.0 x 1.2 cm. This is of unclear clinical significance. Malignancy cannot be entirely excluded given the patient's history of lymphoma     2. CT C/A/P ([address] [facility],[address], 21):   Compared to pre-treatment CT scans performed 10/20/21  CHEST:  No acute abnormality on chest CT   Lymph node adjacent to the descending thoracic aorta which is increased in size 16 x 19 mm.  Prior 7 x 8 mm. Mild increased nodularity along the right major fissure upper lobe; 3 small new subcentimeter lung nodules the lung base.  Malignancy is a consideration   A/P:  Mixed response with progression of mid and upper abdominal conglomerate retroperitoneal adenopathy causing left-sided hydronephrosis (when compared to prior CT of 10/20/2021), and interval reduction in left pelvic sidewall adenopathy and a left inguinal   mass.     3. CT Head ([facility], 21):  1. No acute intracranial abnormality. If indicated, brain MRI may be helpful for further evaluation. 2. Hyperdense material within the right maxillary sinus suggesting inspissated secretions. 3. Unchanged soft tissue mass of the left occipital calvarium.        4. Renal U/S ([facility], 22):  RIGHT KIDNEY: Joelene Arabella limited evaluation. No definite hydronephrosis. LEFT KIDNEY: Probable hydronephrosis but evaluation is markedly limited.    RIGHT RENAL LENGTH: 11.2 cm  (normal is 9-14cm)   LEFT RENAL LENGTH:  10.2 cm    (normal is 9-14cm)   BLADDER: Unremarkable   OTHER:  None     Note: Evaluation is markedly limited as the patient was combative and with limited mobility.     5. CT C/A/P (22):  CHEST:   1.  Large left and moderate right pleural effusion. Malignant effusion is not excluded. 2.  Widespread groundglass opacities in both lungs. Findings may represent edema, infection, or pulmonary lymphoma. 3.  Subcutaneous nodule posterior to the left scapula indeterminate for subcutaneous lymphoma. 4.  Left supraclavicular lymphadenopathy and mediastinal adenopathy consistent with lymphoma. ABDOMEN AND PELVIS:   1.  Conglomerate retroperitoneal lymphadenopathy, abdominal, pelvic, and left greater than right inguinal lymphadenopathy consistent with known lymphoma. 2.  Innumerable peritoneal nodules concerning for peritoneal carcinomatosis. 3.  Stable partially calcified central mesenteric mass. 4.  Air-fluid levels in the bowel may represent diarrhea. No definitive evidence of obstruction on the current study. 5.  AVN of the bilateral femoral heads with subchondral collapse of the right femoral head        6. Echocardiogram (1/4/22): Technically difficult study. Left ventricular cavity size is normal. Normal left ventricular wall   thickness. Overall left ventricular systolic function appears within normal limits with   an estimated ejection fraction of 55-60%. No diagnostic regional wall motion abnormalities noted. Diastolic filling parameters suggests normal diastolic function. Mitral annular calcification is present. No evidence of significant valvular stenosis or regurgitation present.     7. BLE Dopplers (1/4/22): Right   Technically difficult study due to constant patient movement (kicking and   moving his legs) - combative. Within the limitations of this study there appears to be no evidence of deep   or superficial venous thrombosis of the right lower extremity in the veins   visualized. Left   Extremely difficult study on the left leg. There is a large, hypervascular mass noted in the left groin area that is too   large to measure with ultrasound.    Abnormal, continuous venous Doppler signals are noted in the common femoral,   femoral and popliteal vein (possibly due to extrinsic compression by the mass. Only the common femoral, proximal femoral, proximal deep femoral and popliteal   veins could be images (color Doppler imaging was used in lieu of compression   maneuvers). There does not appear to be any evidence of deep venous thrombosis   noted in the above listed veins in the left lower extremity. Study was discontinued on the left calf due to leg movement.     8.  CT head (1/5/22):  1. A 0.5 cm mildly hyperattenuating extra-axial lesion arising along the falx cerebri favored to represent a small meningioma. This could be further evaluated with MRI of the brain with and without contrast.   2. A 3 cm extracranial soft tissue mass of the left occipital scalp which is indeterminant and could represent an enlarged occipital lymph node or other soft tissue mass. 3. Extensive chronic sinusitis. PATHOLOGY:  1. BM Bx/Asp (1/5/22): Pending    2. CSF (1/5/22): Pending    3. Left Inguinal LN Bx (1/5/22): Pending    Microbiology:  1. Urine Culture (1/4/21):   nitrofurantoin <=16 mcg/mL Sensitive    oxacillin >=4 mcg/mL Resistant    tetracycline >=16 mcg/mL Resistant    trimethoprim-sulfamethoxazole <=10 mcg/mL Sensitive    vancomycin 1 mcg/mL Sensitive       2. MRSA Nares Probe (1/5/22):  Positive        PROBLEM LIST:             1. Follicular NHL originally diagnosed 9/2007, most recent relapse 10/2021  2. Spinal Cord Compression associated with original diagnosis in 2007  3. Hypothyroid  4. Anxiety  5. ELIO w/hypercalcemia  6. Metabolic Encephalopathy  7. MRSA UTI (1/2022)      TREATMENT:            1. XRT to T-spine & L-spine; 3000cGy / 10 fractions T1-T4 (9/2007)  Progression? (7/2012): 2. XRT T2 - L3 (6/15/12, L1 - L4 (6/28/12)  3. BR x 6 cycles (7/2012 - 1/2013)  Progression (10/2018)  4. BR x 6 cycles (10/2018  -3/2019)  Progression (10/2021)  5.  BR   Cycle 1 - (11/15/21)      ASSESSMENT AND PLAN:         1. Follicular Lymphoma: Relapsed 10/2021. Now with high concern for possible progression to DLBCL  - Pt declined to have biopsy performed (10/2021)  - CT scans (12/22/21): Mixed response following 1 cycle of BR (scans compared to pre-tx scans (10/20/21)  - Left inguinal LN bx (1/5/22) - Pending   - BM Bx/asp (1/5/22) - pending  - CSF (1/5/22) - Pending    PLAN:  S/p BR (11/15/21). Further tx on hold until acute issues resolved or elucidated / confirmed to be r/t progression       2. ID: Afebrile, septic shock is likely from MRSA and MRSA UTI (both POA)  - Rapid COVID, Viral Resp panel (1/5/22) - negative  - Blood cx (1/5/22) - NGTD  - MRSA swab (1/5/22) - Positive   - Urine Cx (1/4/22) - MRSA  - Meningitis panel (1/5/22) - negative   - Legionella, Strep Pneumoniae ag, Aspergillus & Fungitel (1/5/22) - Pending   - Procalcitonin 5.25 , , Sed rate >120, lactic acid 3.1 (1/4) - all highly elevated  - CT C/A/P (1/4/22): Large left and moderate right pleural effusion. Malignant effusion is not excluded.  Widespread groundglass opacities in both lungs. Findings may represent edema, infection, or pulmonary lymphoma.   - Currently abx are CNS dosing, consider changing   - Cont Nystatin swish for thrush (started 1/5/22)  - Cont IV fluconazole ppx   - Cont tx dose acyclovir Day + 2 (started 1/5/22) - consider changing to ppx dosing  - Merrem Day + 3 (started 1/4/22)   - IV Vanco Day + 2 (started 1/5/22)   - IV Azithromycin Day + 2 (started 1/5/22)    Septic Shock: D/t MRSA PNA and UTI  - Pulm / CC following, appreciate recs  - Cont Levophed, currently @ 18 mcg       3. Heme: Pancytopenia of unclear etiology, likely 2/2 underlying heme malignancy and/or infection   - Does not appear to have TTP (ELIO, AMS, thrombocytopenia):smear without schistocytes and hemolysis labs unremarkable   - Does not appear to be in DIC (1/4/22) - D- dimer 485 & FGN - 766  - Does not appear to be hemolyzing (1/4/22):   Retic low, LDH high, I. Bili - WNL & Haptoglobin - 246  - Possible HLH w/ elevated ferritin 12,924, triglycerides - 418 and pancytopenia , but highly unlikely w/out fever, splenomegaly or elevated transaminases. Will await BM bx results and possible send soluble CD25  - BM bx/asp (1/5/22) - Pending   - Transfuse for Hgb < 7 and Platelets < 10 K  - No transfusion today      4. Metabolic: ELIO likely from ATN w/ improving hyperNa, rising SCr, hyperK, hyperPhos  - Nephrology following Rufina Killings), appreciate recs   - F/c in place  Tumor Lysis Syndrome:  Possible TLS w/ LDH > 6000, elevated K+ and Phos   - Check TLS labs on admit and at least daily for now. May need to increase to BID if grossly abnormal  - Renal panel q6hrs   - Cont IVF:  D5W @ 250 mL/hr  - Likely needs CRRT   HyperCa: Improving, possibly r/t lymphoma relapse with likely bony involvement given his history  - PTH (12/31/21) - low   - SFLC (1/4/22) - Kappa 27.95, lambda 20.35 w/ K/L ratio 1.37, SPEP - Pending   - PTHrp (1/4/22) - pending   - S/p Zometa (1/4/22)   HyperNa:  - Cont IVF: D5W at 250 mL/hr     5. Neuro:  Metabolic Encephalopathy: multifactorial, concerned this is r/t possible progression, but may also be hepatic encephalopathy   - Neurology following, appreciate  - CT head (1/5/21):  A 0.5 cm mildly hyperattenuating extra-axial lesion arising along the falx cerebri favored to represent a small meningioma. This could be further evaluated with MRI of the brain with and without contrast. A 3 cm extracranial soft tissue mass of the left occipital scalp which is indeterminant and could represent an enlarged occipital lymph node or other soft tissue mass. Extensive chronic sinusitis. HyperCa: s/p Zometa (1/4/22)  Hepatic Encephalopathy:  Ammonia (1/6/22) - 109, resume Lactulose 20 g TID (started 1/6/22)  - LP (1/5/22):   Meningitis panel is negative; CSF for cytology pending  - MRI - pending, unable to perform due to patient unable to remain still  - Cont restraints for now  - Cont Seroquel 25 mg bid   - Cont Haldol PRN     6. GI / Nutrition:   Elevated LFTs: lymphoma infiltrate vs other?  - CT C/A/P (1/5/22): Subcutaneous nodule posterior to the left scapula indeterminate for subcutaneous lymphoma. Left supraclavicular lymphadenopathy and mediastinal adenopathy consistent with lymphoma. Conglomerate retroperitoneal lymphadenopathy, abdominal, pelvic, and left greater than right inguinal lymphadenopathy consistent with known lymphoma. Innumerable peritoneal nodules concerning for peritoneal carcinomatosis. - Hepatitis panel (1/1/22) - Neg  Hyperammonemia:  - Resume Lactulose 20 g TID (started 1/6/22)  - Follow ammonia daily  - Consider GI consult   Nutrition:  NPO d/t altered menation  - SLP recommends NPO   - Dietician following  - Cont TF: Change to renal formula via Corepak   - Cont Thiamin     7. Pulm:  Acute respiratory failure d/t MRSA pneumonia and possible malignancy vs infectious effusions  - Pulm following, appreciate recs  - CT C/A/P (1/4/22): Large left and moderate right pleural effusion. Malignant effusion is not excluded.  Widespread groundglass opacities in both lungs. Findings may represent edema, infection, or pulmonary lymphoma  - Currently on 6 l/min O2       8. : progressive RP LAD w/left-sided hydro  - Renal US (1/1/22) - Probable hydronephrosis on left but evaluation is markedly limited. - CT scans (1/4/22) - no hydronephrosis present     9. Endo:   - H/o hypothyroid:  Hypothyroidism:    - TSH / T4 (1/4/22) - 15.7 / 0.4 - elevated & low, but unsure if he was taking his meds  - Cont synthroid 112 mcg daily  Hyperglycemia:  Possibly d/t TF  - Hgb A1c (1/6/22) - Pending  - Start low regimen SSI Q6hrs     10. Cardiac:  Hypotension d/t septic shock  - BNP (1/4/22) - 3543  - Echo (1/5/22) - LVEF 55-60% w/ normal diastolic fxn      11. Acute Debilitation: 2/2 acute medical issues  - Consult SLP/PT/OT once improved. Not appropriate at this time     12.  Code Status: Limited code, continue current level of care  - Per sister Armani Carvajal, [phone]), pt had previously stated he would not want to be resuscitated should he experience cardiopulmonary arrest  - Changed code status to limited code, no to all but vasopressors. Continue aggressive care for now  - Palliative care following, appreciate recs         - DVT Prophylaxis: Platelets <04,416 cells/dL - prophylactic lovenox on hold and mechanical prophylaxis with bilateral SCDs while in bed in place.   Contraindications to pharmacologic prophylaxis: Thrombocytopenia  Contraindications to mechanical prophylaxis: None    - Disposition: Unknown at this time, very guarded at this time    The patient was seen and examined by Dr. Kwan Dorsey, APRN - CNP

## 2022-01-06 NOTE — PLAN OF CARE
Problem: Falls - Risk of:  Goal: Absence of physical injury  Description: Absence of physical injury  Outcome: Met This Shift     Problem: Injury - Risk of, Physical Injury:  Goal: Absence of physical injury  Description: Absence of physical injury  Outcome: Met This Shift     Problem: Non-Violent Restraints  Goal: Removal from restraints as soon as assessed to be safe  Outcome: Ongoing     Problem: Non-Violent Restraints  Goal: No harm/injury to patient while restraints in use  Outcome: Ongoing     Problem: Non-Violent Restraints  Goal: Patient's dignity will be maintained  Outcome: Ongoing     Problem: Falls - Risk of:  Goal: Will remain free from falls  Description: Will remain free from falls  Outcome: Ongoing  Note:  Pt is a High fall risk. See Valere Natalie Fall Score and ABCDS Injury Risk assessments.   + Screening for Orthostasis and/or + High Fall Risk per SINGH/ABCDS: Explained fall risk precautions to pt and family and rationale behind their use to keep the patient safe. Pt bed is in low position, side rails up, call light and belongings are in reach. Fall wristband applied and present on pts wrist.  Bed alarm on. Pt encouraged to call for assistance. Will continue with hourly rounds for PO intake, pain needs, toileting and repositioning as needed. Problem: Skin Integrity:  Goal: Absence of new skin breakdown  Description: Absence of new skin breakdown  Outcome: Ongoing  Note: Skin breakdown prevention in place. Pt repositioned and assessed for incontinence Q2hrs and prn. No s/s of new skin breakdown noted. Offloading, pillows, and wedge support utilized. Bilat SCDS remain in place. Sacral heart on coccyx and bony prominences- C/D/I. Pt on specialty bed. See skin flowsheet assessment.       Problem: Confusion - Acute:  Goal: Absence of continued neurological deterioration signs and symptoms  Description: Absence of continued neurological deterioration signs and symptoms  Outcome: Ongoing     Problem: Confusion - Acute:  Goal: Mental status will be restored to baseline  Description: Mental status will be restored to baseline  Outcome: Ongoing     Problem: Injury - Risk of, Physical Injury:  Goal: Will remain free from falls  Description: Will remain free from falls  Outcome: Ongoing  Note:  Pt is a High fall risk. See [facility] Fall Score and ABCDS Injury Risk assessments.   + Screening for Orthostasis and/or + High Fall Risk per SINGH/ABCDS: Explained fall risk precautions to pt and family and rationale behind their use to keep the patient safe. Pt bed is in low position, side rails up, call light and belongings are in reach. Fall wristband applied and present on pts wrist.  Bed alarm on. Pt encouraged to call for assistance. Will continue with hourly rounds for PO intake, pain needs, toileting and repositioning as needed.

## 2022-01-06 NOTE — DEATH NOTES
Death Pronouncement Note  Patient's Name: Tex Johnson   Patient's YOB: 1965  MRN Number: [id number]    Admitting Provider: Musa Canales DO  Attending Provider: Musa Canales DO    Patient was examined and the following were absent: pulse, blood pressure and respiratory effort     I declared the patient dead on  1/6/2022 at 8:16 am     Preliminary Cause of Death: Septic shock, Severe pancytopenia, Relapsed follicular 14 Rachael Stout is a 63 yo gentleman with longstanding diagnosis of follicular NHL, first diagnosed in September 2007. He has undergone several rounds of radiation and received BR chemotherapy in 2012, 2018, and most recently restarted tx 11/2021.     He most recently had evidence of relapse this past October with diffuse, progressive lymphadenopathy and a new scalp lesion concerning for relapse. He was encourage to have biopsy to confirm FL, however he politely declined to have this done. He was then started on treatment with BR again 11/15/21. He was most recently seen at [facility] 12/14/21 with stable blood counts, and no significant cytopenias. He apparently tolerated cycle 1 well and felt his lymphadenopathy was improving. He presented to outside ED 12/22/21 with c/o refractory epistaxis after falling a couple of days prior. He also reported feeling fatigued, dizzy, and was pallorous with generalized petechiae at that time. He reported hitting his head, neck, back, chest & abdomen and also had laceration to his chin on presentation. He underwent CT scans d/t trauma, however there were no apparent fractures or other traumatic injuries. Regarding lymphoma, CT scans were reported to be c/w mixed response to 1 cycle of BR, when compared to pre-tx scans done in October. Labwork was significant for profound thrombocytopenia & anemia of unclear etiology (Hgb 7.3, Plts 15, previous Hgb 12.2 & Plt 130 on 12/13/21 at [facility]).  He was also found to have Harry (SCr 1.77) hypercalcemia (10.5) and lactic acidosis (3.3). He was then admitted for further evaluation.      During admission he has had progressive, acute deterioration and became encephalopathic for unclear reasons and continues to be profoundly pancytopenic. There was a concern for progressive and transformed lymphoma, he was sent to us for further evaluation. Patient was non verbal and non responsive on arrival. He had crusted blood and lesions in his mouth. He was septic with hypotension and urnalisys suggestive of UTI. cT CAP here suggestive progressive disease with possible peritoneal carinomatosis, possible malignant pleural effusions and bilateral lung infiltrates. Patient was cultured, started on broad spectrum antibiotics. Neurology and pulm cirtical care were consulted as well. He was very agitated and combative, was started on precedex. He underwent a bone marrow biopsy, lymph node biopsy on 1/5/22 and results are pending. He also developed wrosening renal failure. Patient declined overnight, became hypotensive and hypoxic from sepsis. He was started on pressors and placed on non re breather. Patient was a limited code and after multiple discussion with his sister, she suggested patient would not want to be intubated.  Patient passed this morning.         Electronically signed by Carol Ann Alicea DO on 1/6/22 at 8:22 AM EST

## 2022-01-06 NOTE — PROGRESS NOTES
Perfect Serve sent to on call MD, Dr. Suzanne Archibald. RN 1/5/22 10:42PM  Patient's BP is progressively getting softer. He started my shift with MAPs near 65 and precedex at 0.8, I've got him down to 0.5 and MAPs are around 59/60, most current pressure is 86/48. He finished a unit of red cells at 2030 and has MIVF at 125. Please advise, thanks! MD 1/5/22 10:43 PM   Increase IVFs to 250/hr    RN 1/5/22 10:46 PM   MIVF is D5, is that okay? Last glucose was 131 also his Na was 150 with last renal panel at 2000. MD 1/5/22 10:46 PM   Yes    New orders placed verbal with readback, Increased per MAR. Addendum 1/6 0003  RN 1/5/22 11:52PM  Fluids have been increased for an hour, patient is not responding to increase. Most recent pressure 77/39 (51). Please advise. MD 1/5/22 11:55 PM   Begin Levophed and titrate to systolic > 90. After adequate BP lower IVFs to 125    RN 1/5/22 11:55 PM   Would you like me to call ICU for an art line or are cuff pressures okay? MD 1/5/22 11:56 PM   No art line    Orders verified with readback, awaiting pharmacy, will administer per [facility]    Addendum 1/6 0311    RN 1/6/22 3:11 AM   Hi, I'm sorry. Just a change in patient status. My last assessment just a few minutes ago the patient was no longer responding to painful stimulus. Precedex was turned off before 2am.    Spoke with MD over the phone, verified patients blood counts from previous day and that this morning labs were already drawn. Per MD if there is a decrease in platelets give unit and stat head CT. RN 1/6/22 4:29 AM  His ammonia was 109 compared to yesterdays 48.  Platelets stable at 48, sodium decreased 144

## 2022-01-06 NOTE — PLAN OF CARE
Problem: Non-Violent Restraints  Goal: Removal from restraints as soon as assessed to be safe  Outcome: Completed  Goal: No harm/injury to patient while restraints in use  Outcome: Completed  Goal: Patient's dignity will be maintained  Outcome: Completed     Problem: Falls - Risk of:  Goal: Will remain free from falls  Description: Will remain free from falls  Outcome: Completed  Goal: Absence of physical injury  Description: Absence of physical injury  Outcome: Completed     Problem: Skin Integrity:  Goal: Will show no infection signs and symptoms  Description: Will show no infection signs and symptoms  Outcome: Completed  Goal: Absence of new skin breakdown  Description: Absence of new skin breakdown  Outcome: Completed     Problem: Confusion - Acute:  Goal: Absence of continued neurological deterioration signs and symptoms  Description: Absence of continued neurological deterioration signs and symptoms  Outcome: Completed  Goal: Mental status will be restored to baseline  Description: Mental status will be restored to baseline  Outcome: Completed     Problem: Discharge Planning:  Goal: Ability to perform activities of daily living will improve  Description: Ability to perform activities of daily living will improve  Outcome: Completed  Goal: Participates in care planning  Description: Participates in care planning  Outcome: Completed     Problem: Injury - Risk of, Physical Injury:  Goal: Will remain free from falls  Description: Will remain free from falls  Outcome: Completed  Goal: Absence of physical injury  Description: Absence of physical injury  Outcome: Completed     Problem: Mood - Altered:  Goal: Mood stable  Description: Mood stable  Outcome: Completed  Goal: Absence of abusive behavior  Description: Absence of abusive behavior  Outcome: Completed  Goal: Verbalizations of feeling emotionally comfortable while being cared for will increase  Description: Verbalizations of feeling emotionally comfortable while being cared for will increase  Outcome: Completed     Problem: Psychomotor Activity - Altered:  Goal: Absence of psychomotor disturbance signs and symptoms  Description: Absence of psychomotor disturbance signs and symptoms  Outcome: Completed     Problem: Sensory Perception - Impaired:  Goal: Demonstrations of improved sensory functioning will increase  Description: Demonstrations of improved sensory functioning will increase  Outcome: Completed  Goal: Decrease in sensory misperception frequency  Description: Decrease in sensory misperception frequency  Outcome: Completed  Goal: Able to refrain from responding to false sensory perceptions  Description: Able to refrain from responding to false sensory perceptions  Outcome: Completed  Goal: Demonstrates accurate environmental perceptions  Description: Demonstrates accurate environmental perceptions  Outcome: Completed  Goal: Able to distinguish between reality-based and nonreality-based thinking  Description: Able to distinguish between reality-based and nonreality-based thinking  Outcome: Completed  Goal: Able to interrupt nonreality-based thinking  Description: Able to interrupt nonreality-based thinking  Outcome: Completed     Problem: Sleep Pattern Disturbance:  Goal: Appears well-rested  Description: Appears well-rested  Outcome: Completed     Problem: Bleeding:  Goal: Will show no signs and symptoms of excessive bleeding  Description: Will show no signs and symptoms of excessive bleeding  Outcome: Completed

## 2022-01-06 NOTE — PROGRESS NOTES
Pt unresponsive, SpO2 dropped to 85% on 5L NRB and 3L NC. NRB and NC both turned to 15L. SpO2 continued to drop despite increase in supplemental O2. Pt Bps also continued to drop despite 25mcg/min of levo infusing. Levo titrated up to 30mcg/min per order- see MAR. Pt tachynepic and breathing irregularly. PRISCILA Valle and PRISCILA Garza notified of current patient situation. Nps at bedside - Vasopressin and stat CXR ordered. [facility] drawn on [facility]- see flowsheets. [facility]'s sister to update her on situation. Pt passed away despite increased O2 and levo. Dr. Guerda Curiel pronounced time of death. Sister at bedside shortly after patient passed away.

## 2022-01-06 NOTE — FLOWSHEET NOTE
22 0915   Encounter Summary   Services provided to: Patient and family together   Referral/Consult From: Nurse   Continue Visiting   (, bob )   Complexity of Encounter High   Length of Encounter 45 minutes   Routine   Type Initial   Grief and Life Adjustment   Type Death   Assessment Grieving   Intervention Active listening;Explored feelings, thoughts, concerns;Prayer;Sustaining presence/ Ministry of presence;Grief care; Discussed relationship with God;Discussed belief system/Jewish practices/gabriel;Discussed illness/injury and it's impact   Outcome Comfort;Expressed gratitude;Engaged in conversation; Shared life review;Expressed feelings/needs/concerns;Receptive   Sister Re Trujillo expressed that she did not have a  home at this time. She was thankful for the support.    Staff Cinthia Corona MA

## 2022-01-07 LAB
FINAL REPORT: NORMAL
FINAL REPORT: NORMAL
GRAM STAIN RESULT: ABNORMAL
ORGANISM: ABNORMAL
ORGANISM: ABNORMAL
PRELIMINARY: NORMAL
PRELIMINARY: NORMAL
SPE/IFE INTERPRETATION: NORMAL
URINE ELECTROPHORESIS INTERP: NORMAL
WOUND/ABSCESS: ABNORMAL

## 2022-01-08 LAB
BLOOD CULTURE, ROUTINE: NORMAL
CULTURE, BLOOD 2: NORMAL

## 2022-01-09 LAB
ASPERGILLUS GALACTO AG: NEGATIVE
ASPERGILLUS GALACTO INDEX: 0.04

## 2022-01-11 LAB
(1,3)-BETA-D-GLUCAN (FUNGITELL) INTERPRETATION: NORMAL
(1,3)-BETA-D-GLUCAN (FUNGITELL): NORMAL PG/ML

## 2022-01-11 NOTE — PROGRESS NOTES
Physician Progress Note      PATIENT:               Enrique Oquendo  CSN #:                  [id number]  :                       1965  ADMIT DATE:       2022 1:02 PM  [address] [place] DATE:        2022 2:18 PM  RESPONDING  PROVIDER #:        Bartolo Henry          QUERY TEXT:    Patient admitted -  with profound pancytopenia, possibly has progressive   lymphoma, ELIO, AMS & pneumonia. Bone marrow bx obtained on : positive   B-cell non-Hodgkin lymphoma; Current findings would suggest that an aggressive   version of this process is present in the bone marrow, more akin to a diffuse   large  B-cell lymphoma than low-grade follicular lymphoma. If possible,   please document in discharge summary if you are evaluating and/or treating any   of the following: The medical record reflects the following:  Risk Factors: longstanding diagnosis of follicular NHL  Clinical Indicators: Per H&P \" progressive, acute deterioration and is now   encephalopathic for unclear reasons and continues to be profoundly   pancytopenic. W/U if this is malignant, infectious, or some other acute heme   process; likely 2/2 underlying heme malignancy; Pancytopenia likely 2/2   underlying heme malignancy\". Per Death notes: Preliminary Cause of Death:   Septic shock, Severe pancytopenia, Relapsed follicular lymphoma. Per Pulm c/s   \"he possibly has progressive lymphoma involving the CNS +/- bone marrow +/-   liver which could be contributing to his current acute issues\"  Treatment: Bone marrow bx, PRBC transfusion, lab monitoring  Options provided:  -- Diffuse large B-cell lymphoma present on admission  -- Other - I will add my own diagnosis  -- Disagree - Not applicable / Not valid  -- Disagree - Clinically unable to determine / Unknown  -- Refer to Clinical Documentation Reviewer    PROVIDER RESPONSE TEXT:    Provider disagreed with this query. Patient was diagnosed once transfered here from his lymph node biopsy.  HE   passed away prior to biopsy results    Query created by: Eliane Hurt on 1/11/2022 3:06 PM      Electronically signed by:  Kimmie Cross 1/11/2022 3:11 PM        Pathology returned. 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

## 2022-01-14 LAB — PTH RELATED PEPTIDE: 4.2 PMOL/L (ref 0–2.3)

## 2022-02-07 LAB
CULTURE, FUNGUS BLOOD: NORMAL
FUNGUS (MYCOLOGY) CULTURE: NORMAL
FUNGUS (MYCOLOGY) CULTURE: NORMAL
FUNGUS STAIN: NORMAL
FUNGUS STAIN: NORMAL